# Patient Record
Sex: FEMALE | Race: WHITE | Employment: FULL TIME | URBAN - NONMETROPOLITAN AREA
[De-identification: names, ages, dates, MRNs, and addresses within clinical notes are randomized per-mention and may not be internally consistent; named-entity substitution may affect disease eponyms.]

---

## 2019-02-01 ENCOUNTER — NURSE TRIAGE (OUTPATIENT)
Dept: ADMINISTRATIVE | Age: 39
End: 2019-02-01

## 2020-02-23 ENCOUNTER — APPOINTMENT (OUTPATIENT)
Dept: GENERAL RADIOLOGY | Age: 40
End: 2020-02-23
Payer: COMMERCIAL

## 2020-02-23 ENCOUNTER — HOSPITAL ENCOUNTER (EMERGENCY)
Age: 40
Discharge: HOME | End: 2020-02-24
Attending: EMERGENCY MEDICINE
Payer: COMMERCIAL

## 2020-02-23 LAB
EKG ATRIAL RATE: 69 BPM
EKG P AXIS: 63 DEGREES
EKG P-R INTERVAL: 148 MS
EKG Q-T INTERVAL: 362 MS
EKG QRS DURATION: 84 MS
EKG QTC CALCULATION (BAZETT): 387 MS
EKG R AXIS: 45 DEGREES
EKG T AXIS: 42 DEGREES
EKG VENTRICULAR RATE: 69 BPM

## 2020-02-23 PROCEDURE — 99284 EMERGENCY DEPT VISIT MOD MDM: CPT

## 2020-02-23 PROCEDURE — 87804 INFLUENZA ASSAY W/OPTIC: CPT

## 2020-02-23 PROCEDURE — 71046 X-RAY EXAM CHEST 2 VIEWS: CPT

## 2020-02-23 PROCEDURE — 93005 ELECTROCARDIOGRAM TRACING: CPT | Performed by: EMERGENCY MEDICINE

## 2020-02-23 RX ORDER — LEVOTHYROXINE SODIUM 0.05 MG/1
50 TABLET ORAL DAILY
COMMUNITY

## 2020-02-23 RX ORDER — IPRATROPIUM BROMIDE AND ALBUTEROL SULFATE 2.5; .5 MG/3ML; MG/3ML
1 SOLUTION RESPIRATORY (INHALATION) ONCE
Status: COMPLETED | OUTPATIENT
Start: 2020-02-24 | End: 2020-02-24

## 2020-02-23 RX ORDER — HYDROXYZINE PAMOATE 50 MG/1
50 CAPSULE ORAL 3 TIMES DAILY PRN
COMMUNITY

## 2020-02-23 ASSESSMENT — ENCOUNTER SYMPTOMS
NAUSEA: 0
VOMITING: 0
ABDOMINAL DISTENTION: 0
COUGH: 1
EYE ITCHING: 0
CONSTIPATION: 0
BACK PAIN: 0
ABDOMINAL PAIN: 0
CHEST TIGHTNESS: 1
DIARRHEA: 0
SORE THROAT: 0
STRIDOR: 0
PHOTOPHOBIA: 0
EYE REDNESS: 0
SHORTNESS OF BREATH: 1
EYE DISCHARGE: 0
EYE PAIN: 0
WHEEZING: 0
RHINORRHEA: 0

## 2020-02-23 ASSESSMENT — PAIN SCALES - GENERAL: PAINLEVEL_OUTOF10: 6

## 2020-02-23 ASSESSMENT — PAIN DESCRIPTION - PAIN TYPE: TYPE: ACUTE PAIN

## 2020-02-23 ASSESSMENT — PAIN DESCRIPTION - LOCATION: LOCATION: CHEST

## 2020-02-24 VITALS
HEART RATE: 65 BPM | HEIGHT: 70 IN | SYSTOLIC BLOOD PRESSURE: 112 MMHG | RESPIRATION RATE: 16 BRPM | BODY MASS INDEX: 30.78 KG/M2 | OXYGEN SATURATION: 97 % | WEIGHT: 215 LBS | DIASTOLIC BLOOD PRESSURE: 72 MMHG | TEMPERATURE: 97.7 F

## 2020-02-24 LAB
FLU A ANTIGEN: NEGATIVE
FLU B ANTIGEN: NEGATIVE

## 2020-02-24 PROCEDURE — 94640 AIRWAY INHALATION TREATMENT: CPT

## 2020-02-24 PROCEDURE — 94761 N-INVAS EAR/PLS OXIMETRY MLT: CPT

## 2020-02-24 PROCEDURE — 93010 ELECTROCARDIOGRAM REPORT: CPT | Performed by: INTERNAL MEDICINE

## 2020-02-24 PROCEDURE — 6370000000 HC RX 637 (ALT 250 FOR IP): Performed by: EMERGENCY MEDICINE

## 2020-02-24 RX ORDER — ALBUTEROL SULFATE 90 UG/1
2 AEROSOL, METERED RESPIRATORY (INHALATION) 4 TIMES DAILY PRN
Qty: 1 INHALER | Refills: 1 | Status: SHIPPED | OUTPATIENT
Start: 2020-02-24

## 2020-02-24 RX ORDER — BENZONATATE 100 MG/1
100 CAPSULE ORAL 3 TIMES DAILY PRN
Qty: 30 CAPSULE | Refills: 0 | Status: SHIPPED | OUTPATIENT
Start: 2020-02-24 | End: 2020-03-05

## 2020-02-24 RX ADMIN — IPRATROPIUM BROMIDE AND ALBUTEROL SULFATE 1 AMPULE: .5; 3 SOLUTION RESPIRATORY (INHALATION) at 00:15

## 2020-02-24 NOTE — ED TRIAGE NOTES
Pt presents ambulatory to ER with complaints of cough over past several weeks dx with bronchitis at urgent care. Pt states cough becoming more productive and increasing sob since last week.

## 2020-02-24 NOTE — ED PROVIDER NOTES
syncope, weakness and headaches. Psychiatric/Behavioral: Negative for agitation, behavioral problems, confusion, self-injury, sleep disturbance and suicidal ideas. PAST MEDICAL HISTORY    has a past medical history of Seizures ([address] and Varicosities. SURGICAL HISTORY      has a past surgical history that includes Urethra surgery (implant when pt born) and  section, low transverse (). CURRENT MEDICATIONS       Discharge Medication List as of 2020  1:02 AM      CONTINUE these medications which have NOT CHANGED    Details   levothyroxine (SYNTHROID) 50 MCG tablet Take 50 mcg by mouth DailyHistorical Med      hydrOXYzine (VISTARIL) 50 MG capsule Take 50 mg by mouth 3 times daily as needed for AnxietyHistorical Med      ibuprofen (ADVIL;MOTRIN) 800 MG tablet Take 1 tablet by mouth every 8 hours as needed, Disp-60 tablet, R-1      levetiracetam (KEPPRA) 500 MG tablet Take 500 mg by mouth 2 times daily. carBAMazepine (TEGRETOL) 200 MG tablet Take 200 mg by mouth 2 times daily. ALLERGIES     has No Known Allergies. FAMILY HISTORY     has no family status information on file. family history is not on file. SOCIAL HISTORY      reports that she has been smoking cigarettes. She has a 10.00 pack-year smoking history. She has never used smokeless tobacco. She reports that she does not drink alcohol or use drugs. PHYSICAL EXAM     INITIAL VITALS:  height is 5' 10\" (1.778 m) and weight is 215 lb (97.5 kg). Her oral temperature is 97.7 °F (36.5 °C). Her blood pressure is 112/72 and her pulse is 65. Her respiration is 16 and oxygen saturation is 97%. Physical Exam  Vitals signs and nursing note reviewed. Constitutional:       Appearance: She is well-developed. She is not diaphoretic. HENT:      Head: Normocephalic and atraumatic. Nose: Nose normal.   Eyes:      General: No scleral icterus. Right eye: No discharge. Left eye: No discharge. Conjunctiva/sclera: Conjunctivae normal.      Pupils: Pupils are equal, round, and reactive to light. Neck:      Musculoskeletal: Normal range of motion and neck supple. Vascular: No JVD. Trachea: No tracheal deviation. Cardiovascular:      Rate and Rhythm: Normal rate and regular rhythm. Heart sounds: Normal heart sounds. No murmur. No friction rub. No gallop. Pulmonary:      Effort: Pulmonary effort is normal. No respiratory distress. Breath sounds: No stridor. Rhonchi present. No wheezing or rales. Chest:      Chest wall: No tenderness. Abdominal:      General: Bowel sounds are normal. There is no distension. Palpations: Abdomen is soft. There is no mass. Tenderness: There is no abdominal tenderness. There is no guarding or rebound. Hernia: No hernia is present. Musculoskeletal:         General: No tenderness or deformity. Lymphadenopathy:      Cervical: No cervical adenopathy. Skin:     General: Skin is warm and dry. Capillary Refill: Capillary refill takes less than 2 seconds. Coloration: Skin is not pale. Findings: No erythema or rash. Neurological:      Mental Status: She is alert and oriented to person, place, and time. Cranial Nerves: No cranial nerve deficit. Sensory: No sensory deficit. Motor: No abnormal muscle tone. Coordination: Coordination normal.      Deep Tendon Reflexes: Reflexes normal.   Psychiatric:         Behavior: Behavior normal.         Thought Content: Thought content normal.         Judgment: Judgment normal.      Comments: She seems anxious.             DIFFERENTIAL DIAGNOSIS:   URI, Flu, pneumonia, bronchitis    DIAGNOSTIC RESULTS     EKG: All EKG's are interpreted by the Emergency Department Physician who either signs or Co-signsthis chart in the absence of a cardiologist.  None    RADIOLOGY: non-plain film images(s) such as CT, Ultrasound and MRI are read by the radiologist.    XR CHEST STANDARD

## 2020-06-26 LAB
ABSOLUTE BASO #: 0 /CMM (ref 0–200)
ABSOLUTE EOS #: 100 /CMM (ref 0–500)
ABSOLUTE LYMPH #: 1900 /CMM (ref 1000–4800)
ABSOLUTE MONO #: 400 /CMM (ref 0–800)
ABSOLUTE NEUT #: 4600 /CMM (ref 1800–7700)
ANION GAP SERPL CALCULATED.3IONS-SCNC: 6 MMOL/L (ref 4–12)
BASOPHILS RELATIVE PERCENT: 0.6 % (ref 0–2)
BUN BLDV-MCNC: 4 MG/DL (ref 7–20)
CALCIUM SERPL-MCNC: 9.1 MG/DL (ref 8.8–10.5)
CHLORIDE BLD-SCNC: 106 MEQ/L (ref 101–111)
CO2: 24 MEQ/L (ref 21–32)
CREAT SERPL-MCNC: 0.53 MG/DL (ref 0.6–1.3)
CREATININE CLEARANCE: >60
EOSINOPHILS RELATIVE PERCENT: 1.7 % (ref 0–6)
GLUCOSE, FASTING: 69 MG/DL (ref 70–110)
HCT VFR BLD CALC: 48.3 % (ref 35–44)
HEMOGLOBIN: 16.2 GM/DL (ref 12–15)
LYMPHOCYTES RELATIVE PERCENT: 26.7 % (ref 15–45)
MCH RBC QN AUTO: 32.2 PG (ref 27.5–33)
MCHC RBC AUTO-ENTMCNC: 33.5 GM/DL (ref 33–36)
MCV RBC AUTO: 95.9 CU MIC (ref 80–97)
MONOCYTES RELATIVE PERCENT: 6.1 % (ref 2–10)
NEUTROPHILS RELATIVE PERCENT: 64.9 % (ref 40–70)
NUCLEATED RBCS: 0.1 /100 WBC
PDW BLD-RTO: 12.7 % (ref 12–16)
PLATELET # BLD: 232 TH/CMM (ref 150–400)
POTASSIUM SERPL-SCNC: 4 MEQ/L (ref 3.6–5)
RBC # BLD: 5.03 MIL/CMM (ref 4–5.1)
SODIUM BLD-SCNC: 136 MEQ/L (ref 135–145)
WBC # BLD: 7.1 TH/CMM (ref 4.4–10.5)

## 2021-11-11 ENCOUNTER — NURSE ONLY (OUTPATIENT)
Dept: LAB | Age: 41
End: 2021-11-11

## 2021-11-15 LAB
APTIMA MEDIA TYPE: NORMAL
C. TRACHOMATIS DNA,THIN PREP: NEGATIVE
N. GONORRHOEAE DNA, THIN PREP: NEGATIVE
SOURCE: NORMAL
T. VAGINALIS SPECIMEN SOURCE: NORMAL
TRICHOMONAS VAGINALIS BY NAA: NEGATIVE

## 2021-11-19 LAB — CYTOLOGY THIN PREP PAP: NORMAL

## 2022-09-17 ENCOUNTER — HOSPITAL ENCOUNTER (EMERGENCY)
Age: 42
Discharge: LEFT BEFORE DISCHARGE | End: 2022-09-17
Attending: EMERGENCY MEDICINE
Payer: COMMERCIAL

## 2022-09-17 ENCOUNTER — APPOINTMENT (OUTPATIENT)
Dept: CT IMAGING | Age: 42
End: 2022-09-17
Payer: COMMERCIAL

## 2022-09-17 VITALS
TEMPERATURE: 97.7 F | OXYGEN SATURATION: 96 % | BODY MASS INDEX: 30.36 KG/M2 | HEART RATE: 82 BPM | HEIGHT: 69 IN | WEIGHT: 205 LBS | RESPIRATION RATE: 19 BRPM | DIASTOLIC BLOOD PRESSURE: 64 MMHG | SYSTOLIC BLOOD PRESSURE: 118 MMHG

## 2022-09-17 DIAGNOSIS — N39.0 URINARY TRACT INFECTION WITH HEMATURIA, SITE UNSPECIFIED: Primary | ICD-10-CM

## 2022-09-17 DIAGNOSIS — R31.9 URINARY TRACT INFECTION WITH HEMATURIA, SITE UNSPECIFIED: Primary | ICD-10-CM

## 2022-09-17 DIAGNOSIS — R33.8 ACUTE URINARY RETENTION: ICD-10-CM

## 2022-09-17 LAB
ALBUMIN SERPL-MCNC: 3.9 G/DL (ref 3.5–5.1)
ALP BLD-CCNC: 81 U/L (ref 38–126)
ALT SERPL-CCNC: 10 U/L (ref 11–66)
ANION GAP SERPL CALCULATED.3IONS-SCNC: 9 MEQ/L (ref 8–16)
AST SERPL-CCNC: 14 U/L (ref 5–40)
BACTERIA: ABNORMAL /HPF
BASOPHILS # BLD: 0.3 %
BASOPHILS ABSOLUTE: 0 THOU/MM3 (ref 0–0.1)
BILIRUB SERPL-MCNC: 0.3 MG/DL (ref 0.3–1.2)
BILIRUBIN URINE: NEGATIVE
BLOOD, URINE: ABNORMAL
BUN BLDV-MCNC: 8 MG/DL (ref 7–22)
CALCIUM SERPL-MCNC: 8.8 MG/DL (ref 8.5–10.5)
CASTS 2: ABNORMAL /LPF
CASTS UA: ABNORMAL /LPF
CHARACTER, URINE: ABNORMAL
CHLORIDE BLD-SCNC: 101 MEQ/L (ref 98–111)
CO2: 25 MEQ/L (ref 23–33)
COLOR: YELLOW
CREAT SERPL-MCNC: 0.6 MG/DL (ref 0.4–1.2)
CRYSTALS, UA: ABNORMAL
EOSINOPHIL # BLD: 1 %
EOSINOPHILS ABSOLUTE: 0.1 THOU/MM3 (ref 0–0.4)
EPITHELIAL CELLS, UA: ABNORMAL /HPF
ERYTHROCYTE [DISTWIDTH] IN BLOOD BY AUTOMATED COUNT: 11.9 % (ref 11.5–14.5)
ERYTHROCYTE [DISTWIDTH] IN BLOOD BY AUTOMATED COUNT: 41.6 FL (ref 35–45)
GFR SERPL CREATININE-BSD FRML MDRD: > 90 ML/MIN/1.73M2
GLUCOSE BLD-MCNC: 98 MG/DL (ref 70–108)
GLUCOSE URINE: NEGATIVE MG/DL
HCT VFR BLD CALC: 43 % (ref 37–47)
HEMOGLOBIN: 15 GM/DL (ref 12–16)
IMMATURE GRANS (ABS): 0.06 THOU/MM3 (ref 0–0.07)
IMMATURE GRANULOCYTES: 0.5 %
KETONES, URINE: NEGATIVE
LEUKOCYTE ESTERASE, URINE: ABNORMAL
LYMPHOCYTES # BLD: 11.6 %
LYMPHOCYTES ABSOLUTE: 1.4 THOU/MM3 (ref 1–4.8)
MCH RBC QN AUTO: 33 PG (ref 26–33)
MCHC RBC AUTO-ENTMCNC: 34.9 GM/DL (ref 32.2–35.5)
MCV RBC AUTO: 94.5 FL (ref 81–99)
MISCELLANEOUS 2: ABNORMAL
MONOCYTES # BLD: 6.5 %
MONOCYTES ABSOLUTE: 0.8 THOU/MM3 (ref 0.4–1.3)
NITRITE, URINE: NEGATIVE
NUCLEATED RED BLOOD CELLS: 0 /100 WBC
OSMOLALITY CALCULATION: 268.4 MOSMOL/KG (ref 275–300)
PH UA: 7.5 (ref 5–9)
PLATELET # BLD: 238 THOU/MM3 (ref 130–400)
PMV BLD AUTO: 10.8 FL (ref 9.4–12.4)
POTASSIUM SERPL-SCNC: 3.8 MEQ/L (ref 3.5–5.2)
PREGNANCY, SERUM: NEGATIVE
PROTEIN UA: 30
RBC # BLD: 4.55 MILL/MM3 (ref 4.2–5.4)
RBC URINE: ABNORMAL /HPF
RENAL EPITHELIAL, UA: ABNORMAL
SEG NEUTROPHILS: 80.1 %
SEGMENTED NEUTROPHILS ABSOLUTE COUNT: 9.9 THOU/MM3 (ref 1.8–7.7)
SODIUM BLD-SCNC: 135 MEQ/L (ref 135–145)
SPECIFIC GRAVITY, URINE: 1.01 (ref 1–1.03)
TOTAL PROTEIN: 6.5 G/DL (ref 6.1–8)
UROBILINOGEN, URINE: 0.2 EU/DL (ref 0–1)
WBC # BLD: 12.4 THOU/MM3 (ref 4.8–10.8)
WBC UA: > 200 /HPF
YEAST: ABNORMAL

## 2022-09-17 PROCEDURE — 99284 EMERGENCY DEPT VISIT MOD MDM: CPT

## 2022-09-17 PROCEDURE — 51702 INSERT TEMP BLADDER CATH: CPT

## 2022-09-17 PROCEDURE — 85025 COMPLETE CBC W/AUTO DIFF WBC: CPT

## 2022-09-17 PROCEDURE — 87086 URINE CULTURE/COLONY COUNT: CPT

## 2022-09-17 PROCEDURE — 96365 THER/PROPH/DIAG IV INF INIT: CPT

## 2022-09-17 PROCEDURE — 51798 US URINE CAPACITY MEASURE: CPT

## 2022-09-17 PROCEDURE — 87186 SC STD MICRODIL/AGAR DIL: CPT

## 2022-09-17 PROCEDURE — 2580000003 HC RX 258: Performed by: EMERGENCY MEDICINE

## 2022-09-17 PROCEDURE — 6360000002 HC RX W HCPCS: Performed by: EMERGENCY MEDICINE

## 2022-09-17 PROCEDURE — 84703 CHORIONIC GONADOTROPIN ASSAY: CPT

## 2022-09-17 PROCEDURE — 87077 CULTURE AEROBIC IDENTIFY: CPT

## 2022-09-17 PROCEDURE — 80053 COMPREHEN METABOLIC PANEL: CPT

## 2022-09-17 PROCEDURE — 81001 URINALYSIS AUTO W/SCOPE: CPT

## 2022-09-17 RX ORDER — CEPHALEXIN 500 MG/1
500 CAPSULE ORAL 4 TIMES DAILY
Qty: 28 CAPSULE | Refills: 0 | Status: SHIPPED | OUTPATIENT
Start: 2022-09-17 | End: 2022-09-24

## 2022-09-17 RX ADMIN — CEFTRIAXONE SODIUM 1000 MG: 1 INJECTION, POWDER, FOR SOLUTION INTRAMUSCULAR; INTRAVENOUS at 07:07

## 2022-09-17 ASSESSMENT — PAIN SCALES - GENERAL: PAINLEVEL_OUTOF10: 3

## 2022-09-17 ASSESSMENT — ENCOUNTER SYMPTOMS
SHORTNESS OF BREATH: 0
TROUBLE SWALLOWING: 0
VOMITING: 0
EYE REDNESS: 0
NAUSEA: 0
BACK PAIN: 0
ABDOMINAL PAIN: 0
COUGH: 0

## 2022-09-17 ASSESSMENT — PAIN DESCRIPTION - DESCRIPTORS: DESCRIPTORS: PRESSURE

## 2022-09-17 ASSESSMENT — PAIN DESCRIPTION - ORIENTATION: ORIENTATION: LOWER

## 2022-09-17 ASSESSMENT — PAIN - FUNCTIONAL ASSESSMENT: PAIN_FUNCTIONAL_ASSESSMENT: 0-10

## 2022-09-17 ASSESSMENT — PAIN DESCRIPTION - PAIN TYPE: TYPE: ACUTE PAIN

## 2022-09-17 ASSESSMENT — PAIN DESCRIPTION - LOCATION: LOCATION: ABDOMEN

## 2022-09-17 NOTE — ED PROVIDER NOTES
Transfer of Care Note:   I have personally performed a face to face diagnostic evaluation on this patient. The patient's initial evaluation and plan have been discussed with the prior provider who initially evaluated the patient. Nursing Notes, Past Medical Hx, Past Surgical Hx, Social Hx, Allergies, and Family Hx were all reviewed. (Please note that portions of this note were completed with a voice recognition program.  Efforts were made to edit the dictations but occasionally words are mis-transcribed.)    7:18 AM EDT: The patient was evaluated. Sophia Vanessa is a 39 y.o. female who presents to the Emergency Department for the evaluation of urinary hesitancy/retention, found to have UTI, garibay placed. RADIOLOGY: non-plain film images(s) such as CT, Ultrasound and MRI are read by the radiologist.  No orders to display       ED LABS:  Labs Reviewed   CBC WITH AUTO DIFFERENTIAL - Abnormal; Notable for the following components:       Result Value    WBC 12.4 (*)     Segs Absolute 9.9 (*)     All other components within normal limits   COMPREHENSIVE METABOLIC PANEL - Abnormal; Notable for the following components:    ALT 10 (*)     All other components within normal limits   OSMOLALITY - Abnormal; Notable for the following components:    Osmolality Calc 268.4 (*)     All other components within normal limits   URINE WITH REFLEXED MICRO - Abnormal; Notable for the following components:    Blood, Urine LARGE (*)     Protein, UA 30 (*)     Leukocyte Esterase, Urine LARGE (*)     Character, Urine CLOUDY (*)     All other components within normal limits   CULTURE, REFLEXED, URINE   ANION GAP   GLOMERULAR FILTRATION RATE, ESTIMATED   HCG, SERUM, QUALITATIVE       MDM:  Patient with urinary hesitancy/incomplete bladder emptying found to have UTI, garibay placed. At time of sign out awaiting CT results. Abx were ordered to start in ED. I updated patient regarding labs at approximately 07:20.   Advised patient of UTI for which antibiotics have been started, awaiting CT results and may need to leave Garibay in place due to her urinary retention upon presentation. Patient states she will not go home with garibay catheter in. Risks/benefits of Garibay removal discussed. Patient states she wants Garibay catheter removed. Will stay in ED for imaging studies. Patient states at approximately 07:44 am that she now wishes to leave, does not wish to undergo any further imaging including CT scan or other testing. Will remove Garibay per patient request. Patient signed AMA. In spite of multiple attempts by myself and staff to convince the patient to stay for further evaluation and treatment, we have unfortunately been unsuccessful. However, the patient has the capacity to give, receive, and withhold information. The patient does not appear intoxicated or responding to external stimuli. The patient verbalizes understanding of their condition and symptoms and that this represents a significant threat. Risks and benefits of staying for further evaluation and treatment or leaving AMA have been discussed and acknowledged by the patient. The patient has verbalized to me that they understand the plan of diagnosis and treatment, and unfortunately will not agree and understand that it may cause worsening of their condition or death. The patient understands that they are welcome to return to the ED at any time for further care without prejudice and we will be happy to provide treatment again. FINAL IMPRESSION      1. Urinary tract infection with hematuria, site unspecified    2. Acute urinary retention        Care of this patient was transferred from Dr. Vitaly Macias to myself at shift change. Provider:  I personally performed the services described in the documentation, reviewed and edited the documentation which was dictated in my presence, and it accurately records my words and actions.     Ofelia Quan MD 9/17/22 3:21 PM Bandar Flood MD  09/17/22 5899

## 2022-09-17 NOTE — ED PROVIDER NOTES
[facility] [address] [address] EMERGENCY DEPT    EMERGENCY MEDICINE     Pt Name: Laura Adams  MRN: [id number]  [place] 1980  Date of evaluation: 2022  Provider: Bronson Whitehead MD,     [address] [place] [address]       Chief Complaint   Patient presents with    Other     Difficulty urinating       HISTORY OF PRESENT ILLNESS    Laura Adams is a pleasant 39 y.o. female who presents to the emergency department from home with complaints of difficulty in urinating. Patient states that she has had a sensation of incomplete emptying for the past 24 hours. She denies any pain with urination, nausea, vomiting, hematuria, or flank pain. She woke up early in the morning and felt the urge to urinate. She went to the bathroom and only was able to dribble a little bit out. She laid back down but she felt her bladder was very full and she was uncomfortable. 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 She has not had any complications from this. She denies any trauma to the area though she did have sex yesterday. There was no trauma or injury during the sexual encounter. Triage notes and Nursing notes were reviewed by myself. Any discrepancies are addressed above.     PAST MEDICAL HISTORY     Past Medical History:   Diagnosis Date    Seizures ([address]     since young child has epilepsy    Varicosities        SURGICAL HISTORY       Past Surgical History:   Procedure Laterality Date     SECTION, LOW TRANSVERSE  2013    Placenta Previa    URETHRA SURGERY  implant when pt born       CURRENT MEDICATIONS       Discharge Medication List as of 2022  7:44 AM        CONTINUE these medications which have NOT CHANGED    Details   albuterol sulfate  (90 Base) MCG/ACT inhaler Inhale 2 puffs into the lungs 4 times daily as needed for Wheezing, Disp-1 Inhaler, R-1Print      levothyroxine (SYNTHROID) 50 MCG tablet Take 50 mcg by mouth DailyHistorical Med hydrOXYzine (VISTARIL) 50 MG capsule Take 50 mg by mouth 3 times daily as needed for AnxietyHistorical Med      ibuprofen (ADVIL;MOTRIN) 800 MG tablet Take 1 tablet by mouth every 8 hours as needed, Disp-60 tablet, R-1      levetiracetam (KEPPRA) 500 MG tablet Take 500 mg by mouth 2 times daily. carBAMazepine (TEGRETOL) 200 MG tablet Take 200 mg by mouth 2 times daily. ALLERGIES     Patient has no known allergies. FAMILY HISTORY     History reviewed. No pertinent family history. SOCIAL HISTORY       Social History     Socioeconomic History    Marital status: Single     Spouse name: None    Number of children: None    Years of education: None    Highest education level: None   Tobacco Use    Smoking status: Every Day     Packs/day: 0.50     Years: 20.00     Pack years: 10.00     Types: Cigarettes    Smokeless tobacco: Never   Substance and Sexual Activity    Alcohol use: No    Drug use: No    Sexual activity: Yes     Partners: Male       REVIEW OF SYSTEMS     Review of Systems   Constitutional:  Negative for fatigue and fever. HENT:  Negative for congestion and trouble swallowing. Eyes:  Negative for redness. Respiratory:  Negative for cough and shortness of breath. Cardiovascular:  Negative for chest pain. Gastrointestinal:  Negative for abdominal pain, nausea and vomiting. Genitourinary:  Positive for difficulty urinating and urgency. Negative for dysuria, flank pain, vaginal bleeding and vaginal discharge. Musculoskeletal:  Negative for back pain. Skin:  Negative for rash. Allergic/Immunologic: Negative for immunocompromised state. Neurological:  Negative for light-headedness. Hematological:  Does not bruise/bleed easily. Except as noted above the remainder of the review of systems was reviewed and is.    PHYSICAL EXAM    (up to 7 for level 4, 8 or more for level 5)     ED Triage Vitals [09/17/22 0442]   BP Temp Temp Source Heart Rate Resp SpO2 Height Weight 118/64 97.7 °F (36.5 °C) Oral 82 17 96 % 5' 9\" (1.753 m) 205 lb (93 kg)       Physical Exam  Vitals and nursing note reviewed. Exam conducted with a chaperone present. Constitutional:       General: She is not in acute distress. Appearance: She is normal weight. She is not ill-appearing. HENT:      Head: Normocephalic and atraumatic. Nose: Nose normal. No congestion. Mouth/Throat:      Mouth: Mucous membranes are moist.      Pharynx: Oropharynx is clear. No oropharyngeal exudate or posterior oropharyngeal erythema. Eyes:      Extraocular Movements: Extraocular movements intact. Pupils: Pupils are equal, round, and reactive to light. Cardiovascular:      Rate and Rhythm: Normal rate and regular rhythm. Pulses: Normal pulses. Heart sounds: No murmur heard. No friction rub. Pulmonary:      Effort: Pulmonary effort is normal. No respiratory distress. Breath sounds: Normal breath sounds. No wheezing. Abdominal:      General: Abdomen is flat. Bowel sounds are normal. There is no distension. Palpations: Abdomen is soft. Tenderness: There is abdominal tenderness in the suprapubic area. There is no right CVA tenderness, left CVA tenderness, guarding or rebound. Comments: Suprapubic fullness and mild tenderness to palpation   Musculoskeletal:         General: Normal range of motion. Skin:     General: Skin is warm and dry. Capillary Refill: Capillary refill takes less than 2 seconds. Neurological:      General: No focal deficit present. Mental Status: She is alert and oriented to person, place, and time.        DIAGNOSTIC RESULTS     EKG:(none if blank)  All E[facility]'s are [facility] who either signs or Co-signs this chart in the absence of a cardiologist.        RADIOLOGY: (none if blank)   Interpretation per the Radiologistbelow, if available at the time of this note:    No orders to display       LABS:  Labs Reviewed   CULTURE, REFLEXED, URINE - Abnormal; Notable for the following components:       Result Value    Organism Enterobacter cloacae complex (*)     All other components within normal limits    Narrative:     Source: urine       Site:           Current Antibiotics: not stated   CBC WITH AUTO DIFFERENTIAL - Abnormal; Notable for the following components:    WBC 12.4 (*)     Segs Absolute 9.9 (*)     All other components within normal limits   COMPREHENSIVE METABOLIC PANEL - Abnormal; Notable for the following components:    ALT 10 (*)     All other components within normal limits   OSMOLALITY - Abnormal; Notable for the following components:    Osmolality Calc 268.4 (*)     All other components within normal limits   URINE WITH REFLEXED MICRO - Abnormal; Notable for the following components:    Blood, Urine LARGE (*)     Protein, UA 30 (*)     Leukocyte Esterase, Urine LARGE (*)     Character, Urine CLOUDY (*)     All other components within normal limits   ANION GAP   GLOMERULAR FILTRATION RATE, ESTIMATED   HCG, SERUM, QUALITATIVE       All other labs were within normal range or not returned as of this dictation. Please note, any cultures that may have been sent were not resulted at the time of this patient visit. EMERGENCY DEPARTMENT COURSE andMedical Decision Making:     MDM  Number of Diagnoses or Management Options  Acute urinary retention  Urinary tract infection with hematuria, site unspecified  Diagnosis management comments: 77-year-old female presents emergency room with difficulty urinating. Differential includes urinary retention, urinary tract infection, kidney stone, foreign body in the urethra, mass, electrolyte [place], dehydration. Will evaluate with CBC, CMP, UA. Will have a bladder scan done to assess for retention.     /  ED Course as of 09/20/22 2047   Sat Sep 17, 2022   8845 Bladder scan only showed 67 mls however given that I am able to palpate her bladder and she has such a sensation of being unable to urinate will have a Rodriguez catheter placed. [DD]   0701 Urinalysis is consistent with significant urinary tract infection. Given that urinary retention is not a common reaction to infection we will add on a noncontrast CT scan to ensure that there is no obstruction or abscess. [DD]      ED Course User Index  [DD] Levon Kathleen MD         The patient was evaluated during the global COVID-19 pandemic, and that diagnosis was considered upon their initial presentation. Their evaluation, treatment and testing was consistent with current guidelines for patients who present with complaints or symptoms that may be related to COVID-19. Strict returnprecautions and follow up instructions were discussed with the patient with which the patient agrees        ED Medications administered this visit:    Medications   cefTRIAXone (ROCEPHIN) 1,000 mg in dextrose 5 % 50 mL IVPB mini-bag (0 mg IntraVENous Stopped 9/17/22 0759)         Procedures: (None if blank)       CLINICAL       1. Urinary tract infection with hematuria, site unspecified    2.  Acute urinary retention          DISPOSITION/PLAN   DISPOSITION [place] 09/17/2022 07:41:17 AM      PATIENT REFERRED TO:  Anna Coto, [address]  [phone]    Schedule an appointment as soon as possible for a visit       Luis E Holliday   [address] [place] [address] [phone]  Schedule an appointment as soon as possible for a visit       DISCHARGE MEDICATIONS:  Discharge Medication List as of 9/17/2022  7:44 AM        START taking these medications    Details   cephALEXin (KEFLEX) 500 MG capsule Take 1 capsule by mouth 4 times daily for 7 days, Disp-28 capsule, R-0Normal                    (Please note that portions of this note were completed with a voice recognition program.  Efforts were made to edit the dictations but occasionallywords are mis-transcribed.)      Electronically signed by She Mason MD on 9/17/22 at 5:23 AM EDT    Attending Physician, Emergency Department         Anjana Vasquez MD  09/20/22 4853

## 2022-09-17 NOTE — ED NOTES
Pt resting in bed with eyes closed. Respirations even and unlabored. Pt mother at bedside, updated on plan of care.       Ayleen Winter RN  09/17/22 5797

## 2022-09-17 NOTE — ED NOTES
Rodriguez catheter placed. About 200mL urine output noticed after placement. Dr Meenu Overton informed.       Carlos Benavidez RN  09/17/22 7472

## 2022-09-17 NOTE — ED TRIAGE NOTES
Pt presents to the ED with c/o difficulty urinating. Pt states she has a history of urethral implant at 2 y/o. Pt states she has never had any issues. Pt states around 0100, she had went to the restroom, noticed she had slow urine output. Pt states when she went back to bed, she felt like her bladder was full and she had to void. Pt states she has since been dribbling urine and noticed a little blood in the toilet. Pt reports lower abdomen pressure, 2/10. Pt states she has had a UTI before but did not feel like this. Pt denies history of kidney stones.  Pt denies n/v/d.

## 2022-09-19 LAB
ORGANISM: ABNORMAL
URINE CULTURE REFLEX: ABNORMAL

## 2022-09-21 ENCOUNTER — TELEPHONE (OUTPATIENT)
Dept: PHARMACY | Age: 42
End: 2022-09-21

## 2022-09-21 RX ORDER — NITROFURANTOIN 25; 75 MG/1; MG/1
100 CAPSULE ORAL 2 TIMES DAILY
Qty: 10 CAPSULE | Refills: 0 | Status: SHIPPED | OUTPATIENT
Start: 2022-09-21 | End: 2022-09-26

## 2022-09-21 NOTE — TELEPHONE ENCOUNTER
Pharmacy Note  ED Culture Follow-up    Frederick Whitt is a 39 y.o. female. Allergies: Patient has no known allergies. Labs:  Lab Results   Component Value Date    BUN 8 09/17/2022    CREATININE 0.6 09/17/2022    WBC 12.4 (H) 09/17/2022     Estimated Creatinine Clearance: 150 mL/min (based on SCr of 0.6 mg/dL). Current antimicrobials:   Cephalexin 500 mg 4x daily x 7 days (R)    ASSESSMENT:  Micro results:   Urine culture: positive for enterobacter cloacae     PLAN:  Need for intervention: Yes  Discussed with: ANDREINA Young  Chosen treatment:    Stop taking cephalexin. Start taking nitrofurantoin 100 mg BID x 5 days. Patient response:   Called and spoke with patient. She states she is still having UTI symptoms. Informed her of positive urine culture results. Informed her to stop taking cephalexin and to start taking nitrofurantoin. Patient voiced understanding. Called/sent in prescription to: [facility]    Please call with any questions.  Gilbert Blanco [facility] [place], PharmD 5:04 PM 9/21/2022

## 2022-10-13 ENCOUNTER — HOSPITAL ENCOUNTER (OUTPATIENT)
Dept: OCCUPATIONAL THERAPY | Age: 42
Setting detail: THERAPIES SERIES
Discharge: HOME | End: 2022-10-13
Payer: COMMERCIAL

## 2022-10-13 PROCEDURE — 97035 APP MDLTY 1+ULTRASOUND EA 15: CPT

## 2022-10-13 PROCEDURE — 97165 OT EVAL LOW COMPLEX 30 MIN: CPT

## 2022-10-13 NOTE — PROGRESS NOTES
** PLEASE SIGN, DATE AND TIME CERTIFICATION BELOW AND RETURN TO [facility] OUTPATIENT REHABILITATION (FAX #: [phone]). ATTEST/CO-SIGN IF ACCESSING VIA [employer]. THANK YOU.**    I certify that I have examined the patient below and determined that Physical Medicine and Rehabilitation service is necessary and that I approve the established plan of care for up to 90 days or as specifically noted. Attestation, signature or co-signature of physician indicates approval of certification requirements.    ________________________ ____________ __________  Physician Signature   Date   Time   [address] THERAPY  [x] EVALUATION  [] DAILY NOTE (LAND) [] DAILY NOTE (AQUATIC ) [] PROGRESS NOTE [] DISCHARGE NOTE    [] [address] [facility]   [] [place] [address]    [] [address] [facility]   [x] Vera Lesser    Date: 10/13/2022  Patient Name:  Janice Turpin  : 1980  MRN: [id number]  CSN: [id number]    Referring Practitioner Sruthi Quintana MD   Diagnosis Other specified postprocedural states [Z98.890]   Carpal tunnel syndrome, unspecified upper limb [G56.00]  Lesion of ulnar nerve, unspecified upper limb [G56.20]  Osteoarthritis of first carpometacarpal joint, unspecified [M18.9]    Treatment Diagnosis Pain in left thumb and wrist, decreased sensation left hand, decreased left  and pinch strength   Date of Evaluation 10/13/22      Functional Outcome Measure Used Upper Extremity Functional Scale   Functional Outcome Score 68/80  (10/13/22)       Insurance: Primary: Payor: UMR /  /  / ,   Secondary:    Authorization Information: 60 visits per calendar year PT/OT/ST combined - none used, aquatics not covered, modalities covered, no massage   Visit # 1, 1/10 for progress note   Visits Allowed: 60   Recertification Date: 2022   Physician Follow-Up: Approximately 2 weeks from now   Physician Orders: Script for eval and treat.   ADLs, functional activities, manual therapy techniques, therapeutic exercises, HEP, modalities as needed, ROM, strengthening   Pertinent History: Patient states pain in her left forearm and thumb joint started to get worse in April of 2022 when she started to do a more repetitive job. 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 Patient reports she had cortisone injection in her thumb joint on 9/27/22. Patient states testing showed arthritis in her thumb and carpal tunnel syndrome. Past medical history includes epilepsy, anxiety disorder. SUBJECTIVE: Patient states she is to have therapy prior to possibility of having insurance. Social/Functional History:  Medications and Allergies have been reviewed and are listed on the [address] lives with family in a single story home with stairs and no handrail to enter. .    Task Prior Level of Function  (current level of function addressed below)   ADLs  Independent   Ambulation Independent   Transfers Independent   Hobbies boo   Driving Active    Work dakick. Occupation: [employer],  - repetative grasping and reaching     OBJECTIVE:  [address] right handed   Palpation Grinding in thumb joint   Posture Forward shoulders   Edema Mild edema CMC joint       ADL's Difficulty opening jars and bottles, buttoning buttons, increased pain with work tasks - grasping and turning parts       Sensation Left Upper Extremity: Diminished. Tingling in left hand   Coordination 9 Hole Peg Test:   Right: 28 seconds     Left:   26 seconds.   Digit tip opposition intact           LEFT UPPER EXTREMITY  RANGE OF MOTION    AROM PROM COMMENTS         Shoulder Flexion      Shoulder Extension      Shoulder Abduction      Shoulder Adduction      Shoulder External Rotation      Shoulder Internal Rotation      Shoulder Range of Motion is [facility]  [x]      Elbow Flexion      Elbow Extension Forearm Pronation      Forearm Supination      Elbow Range of Motion is [facility]  [x]      Wrist Flexion 80     Wrist Extension 75     Wrist Radial Deviation 20     Wrist Ulnar Deviation 30     Wrist Range of Motion is [facility]  []   Left thumb AROM MP flexion = 55, IP flexion = 85 degrees   Left thumb AROM palmar abduction = 50, PROM = 55 degrees   Left thumb AROM radial abduction = 40, PROM = 45 degrees   If no measurement is recorded, no formal assessment was completed for that motion. LEFT UPPER EXTREMITY  STRENGTH    Strength Rating Comments   Shoulder Flexion     Shoulder Extension     Shoulder Abduction     Shoulder Adduction     Shoulder External Rotation     Shoulder Internal Rotation     []  Shoulder Strength is grossly WFL. Elbow Flexion     Elbow Extension     Forearm Pronation     Forearm Supination     [] Elbow Strength is grossly WFL. Wrist Flexion 4+/5    Wrist Extension 4+/5    Wrist Radial Deviation 4/5 Pain reported with resistance testing   Wrist Ulnar Deviation 4/5    []  Wrist Strength is grossly WFL. Right Left    Strength Setting:      Pinch Strength Tip Pinch:      Lateral Pinch           If no ratings are recorded, no formal assessment was completed.      TREATMENT   Precautions: none per patient    Pain: 3/10 left thumb, wrist     X in shaded column indicates Activity Completed Today   Modalities Parameters/  Location  Notes/Comments   Ultasound Left thenar area/radial wrist 50% pulsed ultrasound 3.3 MHz, 0.8 mari/cm2, x 8 minutes X                Manual Therapy Time/  Technique  Notes/Comments                     Exercises   Sets/  Sec Reps  Notes/Comments                                                    Activities Time    Notes/Comments                       Specific Interventions Next Treatment: ultrasound for pain control, STM, /pinch strengthening, kinesiotaping, nerve glides, HEP    Activity/Treatment Tolerance:  [x]  Patient tolerated treatment well  [] Patient limited by fatigue  []  Patient limited by pain   []  Patient limited by other medical complications  []  Other:     Assessment: Patient presents with pain in her left thumb and wrist as well as crepitus with thumb ROM. Patient has impaired left  and pinch with work and home tasks. She reports difficulty and pain with opening jars and containers, buttoning buttons, grasping and turning parts at work. Crepitus is noted with left thumb ROM. Patient requires skilled OT to increase functional strength, decrease pain, modalities for increase ease with  and pinch, increased strength for return to prior level of functioning. Areas for Improvement: edema, impaired ROM, impaired sensation, impaired strength, and pain  Prognosis: good    GOALS:  Patient Goal: Increase strength. Decrease pain to  better. Short Term Goals:  Time Frame: 5 weeks   Patient will be independent with UE HEP for increased ease with grasping objects for inspection at work. Patient will report pain in left hand/wrist no greater than 2/10 with use. Patient will increase left  strength to 55, left tip pinch to 8 and lateral pinch to 10# for increased ease with opening jars and packages. Patient will increase AROM left thumb radial abduction to 50 degrees for increased ease with grasping and opening jars/containers. Long Term Goals:  Time Frame: 6 weeks  1. Patient will improve UEFS to at least 73.  2.  Patient will report no more than 1/10 pain in left hand/thumb with opening bottles and grasping tasks at work. Patient Education:   [x]  HEP/Education Completed: Plan of Care, Goals, purpose of ultrasound  [employer] Access Code for HEP: Access Code: [email or web address]  URL: [email or web address]  Date: 10/13/2022  Prepared by:  Mary Little    Exercises  Wrist Prayer Stretch - 3 x daily - 7 x weekly - 1 sets - 5 reps - 19 hold  Seated Wrist Flexor Hook Fist Tendon Gliding - 3 x daily - 7 x weekly - 1 sets - 10 reps  Seated Wrist Flexor Full Fist Tendon Gliding - 3 x daily - 7 x weekly - 1 sets - 10 reps  Seated Straight Fist AROM - 3 x daily - 7 x weekly - 1 sets - 10 reps  Thumb Abduction AROM on Table - 3 x daily - 7 x weekly - 1 sets - 10 reps    []  No new Education completed  []  Reviewed Prior HEP      [x]  Patient verbalized and/or demonstrated understanding of education provided. []  Patient unable to verbalize and/or demonstrate understanding of education provided. Will continue education. [x]  Barriers to learning: none    PLAN:  Treatment Recommendations: Strengthening, Range of Motion, Manual Therapy - Soft Tissue Mobilization, Home Exercise Program, Modalities, and Kinesiotaping    [x]  Plan of care initiated. Plan to see patient 2 times per week for 6 weeks to address the treatment planned outlined above. []  Continue with current plan of care  []  Modify plan of care as follows:    []  Hold pending physician visit  []  Discharge    Time In 1435   Time Out 1527   Timed Code Minutes: 8 min   Total Treatment Time: 53 min       Electronically Signed by:  Lyn Elam OTR/L #[id number]

## 2022-10-19 ENCOUNTER — HOSPITAL ENCOUNTER (OUTPATIENT)
Dept: OCCUPATIONAL THERAPY | Age: 42
Setting detail: THERAPIES SERIES
Discharge: HOME | End: 2022-10-19
Payer: COMMERCIAL

## 2022-10-19 PROCEDURE — 97035 APP MDLTY 1+ULTRASOUND EA 15: CPT

## 2022-10-19 PROCEDURE — 97110 THERAPEUTIC EXERCISES: CPT

## 2022-10-19 NOTE — PROGRESS NOTES
[address] THERAPY  [] EVALUATION  [x] DAILY NOTE (LAND) [] DAILY NOTE (AQUATIC ) [] PROGRESS NOTE [] DISCHARGE NOTE    [] [facility]   [] [place]    [] [address] [facility]   [x] Linda Espinosa    Date: 10/19/2022  Patient Name:  Nelson Da Silva  : 1980  MRN: [id number]  CSN: [id number]    Referring Practitioner No ref. provider found   Diagnosis Other specified postprocedural states [Z98.890]   Carpal tunnel syndrome, unspecified upper limb [G56.00]  Lesion of ulnar nerve, unspecified upper limb [G56.20]  Osteoarthritis of first carpometacarpal joint, unspecified [M18.9]    Treatment Diagnosis Pain in left thumb and wrist, decreased sensation left hand, decreased left  and pinch strength   Date of Evaluation 10/13/22      Functional Outcome Measure Used Upper Extremity Functional Scale   Functional Outcome Score 68/80  (10/13/22)       Insurance: Primary: Payor: UMR /  /  / ,   Secondary:    Authorization Information: 60 visits per calendar year PT/OT/ST combined - none used, aquatics not covered, modalities covered, no massage   Visit # 2, 2/10 for progress note   Visits Allowed: 60   Recertification Date: 2022   Physician Follow-Up: Approximately 2 weeks from now   Physician Orders: Script for eval and treat. ADLs, functional activities, manual therapy techniques, therapeutic exercises, HEP, modalities as needed, ROM, strengthening   Pertinent History: Patient states pain in her left forearm and thumb joint started to get worse in 2022 when she started to do a more repetitive job. 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 Patient reports she had cortisone injection in her thumb joint on 22. Patient states testing showed arthritis in her thumb and carpal tunnel syndrome. Past medical history includes epilepsy, anxiety disorder. SUBJECTIVE: Patient arrives late to session, reports she forgot about her appointment. Patient reports taking daily anti-inflammatory. Patient reports (-) sleep disturbances. TREATMENT   Precautions: none per patient    Pain: 0/10 left thumb, wrist     X in shaded column indicates Activity Completed Today   Modalities Parameters/  Location  Notes/Comments   Ultasound Left thenar area/radial wrist 50% pulsed ultrasound 3.3 MHz, 0.8 mari/cm2, x 8 minutes X                Manual Therapy Time/  Technique  Notes/Comments   PROM L wrist all motions, Thumb ROM all motions to tolerance   x    Thenar eminence press   x Tightness and minor discomfort          Exercises   Sets/  Sec Reps  Notes/Comments   Thumb IP/MP Blocking 1 10 ea x    Thumb Flexion/extension, radial/palmar abduction 1 10 ea x    Hook Fist, Long Fist  1 10 ea x    Wrist AROM all planes  1 10 ea x    Wrist PREs 1# over towel roll 2 sec 10 ea x    Green Clothespins- isometric hold 2 point and lateral pinch  2 sec 10 ea x           Activities Time    Notes/Comments                       Specific Interventions Next Treatment: ultrasound for pain control, STM, /pinch strengthening, kinesiotaping, nerve glides, HEP    Activity/Treatment Tolerance:  [x]  Patient tolerated treatment well  []  Patient limited by fatigue  []  Patient limited by pain   []  Patient limited by other medical complications  []  Other:     Assessment: Patient reports to first session since OT evaluation. Initiated gentle strengthening noted above, good tolerance. Fatigue noted end of session, denies pain. Areas for Improvement: edema, impaired ROM, impaired sensation, impaired strength, and pain  Prognosis: good    GOALS:  Patient Goal: Increase strength. Decrease pain to  better. Short Term Goals:  Time Frame: 5 weeks   Patient will be independent with UE HEP for increased ease with grasping objects for inspection at work.   Patient will report pain in left hand/wrist no greater than 2/10 with use. Patient will increase left  strength to 55, left tip pinch to 8 and lateral pinch to 10# for increased ease with opening jars and packages. Patient will increase AROM left thumb radial abduction to 50 degrees for increased ease with grasping and opening jars/containers. Long Term Goals:  Time Frame: 6 weeks  1. Patient will improve UEFS to at least 73.  2.  Patient will report no more than 1/10 pain in left hand/thumb with opening bottles and grasping tasks at work. Patient Education:   []  HEP/Education Completed: Plan of Care, Goals, purpose of ultrasound  [employer] Access Code for HEP: Access Code: [email or web address]  URL: [email or web address]  Date: 10/13/2022  Prepared by: Keo Barlow    Exercises  Wrist Prayer Stretch - 3 x daily - 7 x weekly - 1 sets - 5 reps - 19 hold  Seated Wrist Flexor Hook Fist Tendon Gliding - 3 x daily - 7 x weekly - 1 sets - 10 reps  Seated Wrist Flexor Full Fist Tendon Gliding - 3 x daily - 7 x weekly - 1 sets - 10 reps  Seated Straight Fist AROM - 3 x daily - 7 x weekly - 1 sets - 10 reps  Thumb Abduction AROM on Table - 3 x daily - 7 x weekly - 1 sets - 10 reps    []  No new Education completed  [x]  Reviewed Prior HEP      [x]  Patient verbalized and/or demonstrated understanding of education provided. []  Patient unable to verbalize and/or demonstrate understanding of education provided. Will continue education. [x]  Barriers to learning: none    PLAN:  Treatment Recommendations: Strengthening, Range of Motion, Manual Therapy - Soft Tissue Mobilization, Home Exercise Program, Modalities, and Kinesiotaping    []  Plan of care initiated. Plan to see patient 2 times per week for 6 weeks to address the treatment planned outlined above.   [x]  Continue with current plan of care  []  Modify plan of care as follows:    []  Hold pending physician visit  []  Discharge    Time In 1600   Time Out 1632   Timed Code Minutes: 32 min   Total Treatment Time: 32 min       Electronically Signed by:  Bre HERNANDEZ #[id number]

## 2022-10-21 ENCOUNTER — HOSPITAL ENCOUNTER (OUTPATIENT)
Dept: OCCUPATIONAL THERAPY | Age: 42
Setting detail: THERAPIES SERIES
End: 2022-10-21
Payer: COMMERCIAL

## 2022-10-24 ENCOUNTER — HOSPITAL ENCOUNTER (OUTPATIENT)
Dept: OCCUPATIONAL THERAPY | Age: 42
Setting detail: THERAPIES SERIES
Discharge: HOME | End: 2022-10-24
Payer: COMMERCIAL

## 2022-10-24 PROCEDURE — 97035 APP MDLTY 1+ULTRASOUND EA 15: CPT

## 2022-10-24 PROCEDURE — 97110 THERAPEUTIC EXERCISES: CPT

## 2022-10-24 NOTE — PROGRESS NOTES
[address] THER[address]  [] EVALUATION  [x] DAILY NOTE (LAND) [] DAILY NOTE (AQUATIC ) [] PROGRESS NOTE [] DISCHARGE NOTE    [] [address] [facility] [place]   [] [place] [address]    [] [address] [facility]   [x] Linda Espinosa    Date: 10/24/2022  Patient Name:  Nelson Da Silva  : 1980  MRN: [id number]  CSN: [id number]    Referring Practitioner Wisam Dan MD   Diagnosis Other specified postprocedural states [Z98.890]   Carpal tunnel syndrome, unspecified upper limb [G56.00]  Lesion of ulnar nerve, unspecified upper limb [G56.20]  Osteoarthritis of first carpometacarpal joint, unspecified [M18.9]    Treatment Diagnosis Pain in left thumb and wrist, decreased sensation left hand, decreased left  and pinch strength   Date of Evaluation 10/13/22      Functional Outcome Measure Used Upper Extremity Functional Scale   Functional Outcome Score 68/80  (10/13/22)       Insurance: Primary: Payor: UMR /  /  / ,   Secondary:    Authorization Information: 60 visits per calendar year PT/OT/ST combined - none used, aquatics not covered, modalities covered, no massage   Visit # 3, 3/10 for progress note   Visits Allowed: 60   Recertification Date: 2022   Physician Follow-Up: Approximately 2 weeks from now   Physician Orders: Script for eval and treat. ADLs, functional activities, manual therapy techniques, therapeutic exercises, HEP, modalities as needed, ROM, strengthening   Pertinent History: Patient states pain in her left forearm and thumb joint started to get worse in 2022 when she started to do a more repetitive job. 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 Patient reports she had cortisone injection in her thumb joint on 22. Patient states testing showed arthritis in her thumb and carpal tunnel syndrome. Past medical history includes epilepsy, anxiety disorder. SUBJECTIVE: Patient reports achy in thumb/wrist area. OBJECTIVE:  TREATMENT   Precautions: none per patient    Pain: 4/10 left thumb, wrist     X in shaded column indicates Activity Completed Today   Modalities Parameters/  Location  Notes/Comments   Ultasound Left thenar area/radial wrist 50% pulsed ultrasound 3.3 MHz, 0.8 mari/cm2, x 8 minutes X                Manual Therapy Time/  Technique  Notes/Comments   PROM L wrist all motions, Thumb ROM all motions to tolerance   X    Thenar eminence press   X Tightness and minor discomfort    STM manually and with IASTM tools to thenar eminence  X Mild crepitus noted   Exercises   Sets/  Sec Reps  Notes/Comments   Thumb IP/MP Blocking 1 10 ea X    Thumb Flexion/extension, radial/palmar abduction 1 10 ea X    Hook Fist, Long Fist  1 10 ea X    Wrist flexion/extension with digits straight and with digits extended, UD/RD 1 10 ea X    Wrist PREs 2# over towel roll 2 sec 10 ea X Completed in supination and pronation, UD/RD   [employer] Clothespins- isometric hold 2 point and lateral pinch  2 sec 10 ea X           Activities Time    Notes/Comments                       Specific Interventions Next Treatment: ultrasound for pain control, STM, /pinch strengthening, kinesiotaping, nerve glides, HEP    Activity/Treatment Tolerance:  [x]  Patient tolerated treatment well  []  Patient limited by fatigue  []  Patient limited by pain   []  Patient limited by other medical complications  []  Other:     Assessment: Patient is progressing towards her goals. Mild crepitus noted in thenar eminence with IASTM tools. Areas for Improvement: edema, impaired ROM, impaired sensation, impaired strength, and pain  Prognosis: good    GOALS:  Patient Goal: Increase strength. Decrease pain to  better. Short Term Goals:  Time Frame: 5 weeks   Patient will be independent with UE HEP for increased ease with grasping objects for inspection at work.   Patient will report pain in left hand/wrist no greater than 2/10 with use. Patient will increase left  strength to 55, left tip pinch to 8 and lateral pinch to 10# for increased ease with opening jars and packages. Patient will increase AROM left thumb radial abduction to 50 degrees for increased ease with grasping and opening jars/containers. Long Term Goals:  Time Frame: 6 weeks  1. Patient will improve UEFS to at least 73.  2.  Patient will report no more than 1/10 pain in left hand/thumb with opening bottles and grasping tasks at work. Patient Education:   [x]  HEP/Education Completed: Plan of Care, Goals, purpose of ultrasound  [employer] Access Code for HEP: Access Code: [email or web address]  URL: [email or web address]  Date: 10/13/2022  Prepared by: Duke Lantigua    Exercises  Wrist Prayer Stretch - 3 x daily - 7 x weekly - 1 sets - 5 reps - 19 hold  Seated Wrist Flexor Hook Fist Tendon Gliding - 3 x daily - 7 x weekly - 1 sets - 10 reps  Seated Wrist Flexor Full Fist Tendon Gliding - 3 x daily - 7 x weekly - 1 sets - 10 reps  Seated Straight Fist AROM - 3 x daily - 7 x weekly - 1 sets - 10 reps  Thumb Abduction AROM on Table - 3 x daily - 7 x weekly - 1 sets - 10 reps  Purpose of IASTM tools, new schedule given  []  No new Education completed  [x]  Reviewed Prior HEP      [x]  Patient verbalized and/or demonstrated understanding of education provided. []  Patient unable to verbalize and/or demonstrate understanding of education provided. Will continue education. [x]  Barriers to learning: none    PLAN:  Treatment Recommendations: Strengthening, Range of Motion, Manual Therapy - Soft Tissue Mobilization, Home Exercise Program, Modalities, and Kinesiotaping    []  Plan of care initiated. Plan to see patient 2 times per week for 6 weeks to address the treatment planned outlined above.   [x]  Continue with current plan of care  []  Modify plan of care as follows:    []  Hold pending physician visit  []  Discharge    Time In 1517   Time Out 1550   Timed Code Minutes: 33 min   Total Treatment Time: 33 min       Electronically Signed by: . Guido Chin, OTR/L #[id number] DISPLAY PLAN FREE TEXT

## 2022-11-01 ENCOUNTER — HOSPITAL ENCOUNTER (OUTPATIENT)
Dept: OCCUPATIONAL THERAPY | Age: 42
Setting detail: THERAPIES SERIES
Discharge: HOME | End: 2022-11-01
Payer: COMMERCIAL

## 2022-11-01 PROCEDURE — 97140 MANUAL THERAPY 1/> REGIONS: CPT

## 2022-11-01 PROCEDURE — 97110 THERAPEUTIC EXERCISES: CPT

## 2022-11-01 PROCEDURE — 97035 APP MDLTY 1+ULTRASOUND EA 15: CPT

## 2022-11-01 NOTE — PROGRESS NOTES
[address]  [] EVALUATION  [x] DAILY NOTE (LAND) [] DAILY NOTE (AQUATIC ) [] PROGRESS NOTE [] DISCHARGE NOTE    [] [facility]   [] [facility]     [] [facility]   [x] Homar Rizvi    Date: 2022  Patient Name:  Stevie Richter  : 1980  MRN: [id number]  CSN: [id number]    Referring Practitioner Beula Cockayne., MD   Diagnosis Other specified postprocedural states [Z98.890]   Carpal tunnel syndrome, unspecified upper limb [G56.00]  Lesion of ulnar nerve, unspecified upper limb [G56.20]  Osteoarthritis of first carpometacarpal joint, unspecified [M18.9]    Treatment Diagnosis Pain in left thumb and wrist, decreased sensation left hand, decreased left  and pinch strength   Date of Evaluation 10/13/22      Functional Outcome Measure Used Upper Extremity Functional Scale   Functional Outcome Score 68/80  (10/13/22)       Insurance: Primary: Payor: UMR /  /  / ,   Secondary:    Authorization Information: 60 visits per calendar year PT/OT/ST combined - none used, aquatics not covered, modalities covered, no massage   Visit # 4, 410 for progress note   Visits Allowed: 60   Recertification Date: 2022   Physician Follow-Up: Approximately 2 weeks from now   Physician Orders: Script for eval and treat. ADLs, functional activities, manual therapy techniques, therapeutic exercises, HEP, modalities as needed, ROM, strengthening   Pertinent History: Patient states pain in her left forearm and thumb joint started to get worse in 2022 when she started to do a more repetitive job. 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 Patient reports she had cortisone injection in her thumb joint on 22. Patient states testing showed arthritis in her thumb and carpal tunnel syndrome. Past medical history includes epilepsy, anxiety disorder. SUBJECTIVE: Patient states nothing new with left thumb. OBJECTIVE:  TREATMENT   Precautions: none per patient    Pain: 3/10 left thumb, wrist     X in shaded column indicates Activity Completed Today   Modalities Parameters/  Location  Notes/Comments   Ultasound Left thenar area/radial wrist 50% pulsed ultrasound 3.3 MHz, 0.8 mari/cm2, x 8 minutes X                Manual Therapy Time/  Technique  Notes/Comments   PROM L wrist all motions, Thumb ROM all motions to tolerance   X    Thenar eminence press   X    STM manually and with IASTM tools to thenar eminence  X Mild crepitus noted   Exercises   Sets/  Sec Reps  Notes/Comments   Thumb IP/MP Blocking 1 10 ea X    Thumb Flexion/extension, radial/palmar abduction 1 10 ea X    Hook Fist, Long Fist  1 10 ea X    Wrist flexion/extension with digits straight and with digits extended, UD/RD 1 12 ea X    Wrist PREs 2# over towel roll, elbow flexion/extension 2 sec 12 ea X Completed in supination and pronation, UD/RD   Meriam Lalo Clothespins- isometric hold 2 point and lateral pinch  2 sec 10 ea X    Orange putty for left gripping and pinching  3 minutes X Initiated, used 3 point pinch today   Activities Time    Notes/Comments                       Specific Interventions Next Treatment: ultrasound for pain control, STM, /pinch strengthening, kinesiotaping, nerve glides, HEP    Activity/Treatment Tolerance:  [x]  Patient tolerated treatment well  []  Patient limited by fatigue  []  Patient limited by pain   []  Patient limited by other medical complications  []  Other:     Assessment: Patient is progressing towards her goals. Mild crepitus noted in thenar eminence with IASTM tools. Areas for Improvement: edema, impaired ROM, impaired sensation, impaired strength, and pain  Prognosis: good    GOALS:  Patient Goal: Increase strength. Decrease pain to  better.     Short Term Goals:  Time Frame: 5 weeks   Patient will be independent with UE HEP for increased ease with grasping objects for inspection at work. Patient will report pain in left hand/wrist no greater than 2/10 with use. Patient will increase left  strength to 55, left tip pinch to 8 and lateral pinch to 10# for increased ease with opening jars and packages. Patient will increase AROM left thumb radial abduction to 50 degrees for increased ease with grasping and opening jars/containers. Long Term Goals:  Time Frame: 6 weeks  1. Patient will improve UEFS to at least 73.  2.  Patient will report no more than 1/10 pain in left hand/thumb with opening bottles and grasping tasks at work. Patient Education:   []  HEP/Education Completed: Plan of Care, Goals, purpose of ultrasound  [email or web address] Access Code for HEP: Access Code: [email or web address]  URL: [email or web address]  Date: 10/13/2022  Prepared by: Jarrett Iglesias    Exercises  Wrist Prayer Stretch - 3 x daily - 7 x weekly - 1 sets - 5 reps - 19 hold  Seated Wrist Flexor Hook Fist Tendon Gliding - 3 x daily - 7 x weekly - 1 sets - 10 reps  Seated Wrist Flexor Full Fist Tendon Gliding - 3 x daily - 7 x weekly - 1 sets - 10 reps  Seated Straight Fist AROM - 3 x daily - 7 x weekly - 1 sets - 10 reps  Thumb Abduction AROM on Table - 3 x daily - 7 x weekly - 1 sets - 10 reps  Purpose of IASTM tools, new schedule given  []  No new Education completed  [x]  Reviewed Prior HEP      [x]  Patient verbalized and/or demonstrated understanding of education provided. []  Patient unable to verbalize and/or demonstrate understanding of education provided. Will continue education. [x]  Barriers to learning: none    PLAN:  Treatment Recommendations: Strengthening, Range of Motion, Manual Therapy - Soft Tissue Mobilization, Home Exercise Program, Modalities, and Kinesiotaping    []  Plan of care initiated. Plan to see patient 2 times per week for 6 weeks to address the treatment planned outlined above.   [x]  Continue with current plan of care  []  Modify plan of care as follows:    []  Hold pending physician visit  []  Discharge    Time In 1454   Time Out 1532   Timed Code Minutes: 38 min   Total Treatment Time: 38 min       Electronically Signed by: . DARRELL Conn/TITI #[id number]

## 2023-03-14 ENCOUNTER — HOSPITAL ENCOUNTER (OUTPATIENT)
Dept: OCCUPATIONAL THERAPY | Age: 43
Setting detail: THERAPIES SERIES
Discharge: HOME | End: 2023-03-14
Payer: COMMERCIAL

## 2023-03-14 PROCEDURE — 97165 OT EVAL LOW COMPLEX 30 MIN: CPT

## 2023-03-14 PROCEDURE — 97110 THERAPEUTIC EXERCISES: CPT

## 2023-03-14 NOTE — PROGRESS NOTES
** PLEASE SIGN, DATE AND TIME CERTIFICATION BELOW AND RETURN TO [facility] OUTPATIENT REHABILITATION (FAX #: [phone]). ATTEST/CO-SIGN IF ACCESSING VIA INFOLUP. THANK YOU.**    I certify that I have examined the patient below and determined that Physical Medicine and Rehabilitation service is necessary and that I approve the established plan of care for up to 90 days or as specifically noted.   Attestation, signature or co-signature of physician indicates approval of certification requirements.    ________________________ ____________ __________  Physician Signature   Date   Time   [address]  [x] EVALUATION  [] DAILY NOTE (LAND) [] DAILY NOTE (AQUATIC ) [] PROGRESS NOTE [] DISCHARGE NOTE    [] [facility]   [] [place] [address]    [] [facility]   [x] [facility]    Date: 3/14/2023  Patient Name:  Brianne Redman  : 1980  MRN: [id number]  CSN: [id number]    Referring Practitioner JAN Temple   Diagnosis Carpal tunnel syndrome, unspecified upper limb [G56.00]  Lesion of ulnar nerve, unspecified upper limb [G56.20]  Osteoarthritis of first carpometacarpal joint, unspecified [M18.9]    Treatment Diagnosis L hand pain, weakness, stiffness s/p L thumb CMC arthroplasty, L CTR, and L ulnar nerve transposition   Date of Evaluation 3/14/23      Functional Outcome Measure Used UEFI   Functional Outcome Score 15/80 (3/14/23)       Insurance: Primary: Payor: R /  /  / ,   Secondary:    Authorization Information: PRE CERTIFICATION REQUIRED: NO  INSURANCE THERAPY BENEFIT:  60 VISITS OF PT/OT/ST COMBINED -SOFTMAX -NONE USED  AQUATIC THERAPY COVERED: YES  MODALITIES COVERED:  YES, NO IONTO, NO MASSAGE   Visit # 1, 1/10 for progress note   Visits Allowed: 60 per year   Recertification Date: 2023   Physician Follow-Up: 2023   Physician Orders: Sharyle Boss and tx, ADL, joint mobs,    Pertinent History: Pt is 42 y/o F with hx of L hand pain presents to therapy s/p L thumb CMC arthroplasty, L CTR, and L ulnar nerve transposition on 2/15/23. Pt seen by her surgeon ~1 week ago who had her stitches removed, per pt stated pt healing as expected, and referred pt to OT for eval and tx. Pt  has a past medical history of Seizures ([address] and Varicosities. SUBJECTIVE: Pt is pleasant and cooperative    Social/Functional History:  Medications and Allergies have been reviewed and are listed on the [facility] [employer]  boyfriend and kids    Task Prior Level of Function  (current level of function addressed below)   ADLs  Independent   Ambulation Independent   Transfers Independent   Hobbies Sam   Driving Active    Work [employer]. Occupation: [employer], currently off work , lifts at least 30 lbs     OBJECTIVE:  Hand Dominance right handed   Palpation    Observation Healing scars/surgical incisions present at base of thumb/CMCJ, over carpal tunnel, volar forearm, and medial elbow, min edema visible in thenar eminence   Posture    Edema    Special Tests        ADL's Pt reports she has some difficulty with nearly all ADL and IADL tasks, especially doing her hair, putting on bra, and putting on jeans. Pt reports she can do laundry but is unable to fold and has been limited in her ability to sweep and clean her home using her L hand to assist. Pt is currently off work due to post op restrictions per physician   Bed Mobility     Transfers    Balance        Sensation Left Upper Extremity: Intact.    Coordination 9 hole peg R hand: 21.08 L hand 32.85           LEFT UPPER EXTREMITY  RANGE OF MOTION    Right Left COMMENTS         Shoulder Flexion      Shoulder Extension      Shoulder Abduction      Shoulder Adduction      Shoulder External Rotation      Shoulder Internal Rotation      Shoulder Range of Motion is [facility]  []      Elbow Flexion  140    Elbow Extension  0    Forearm Pronation  75 Forearm Supination  75    Elbow Range of Motion is [facility]  [x]      Wrist Flexion 78 60    Wrist Extension 70 57    Wrist Radial Deviation 17 3    Wrist Ulnar Deviation 42 30    Wrist Range of Motion is WFL  []   If no measurement is recorded, no formal assessment was completed for that motion. LEFT UPPER EXTREMITY  HAND RANGE OF MOTION    Right Left COMMENTS   Thump MP 67 26    Thumb IP 67 25    Thumb Radial Abduction/Adduction 51 39    Thumb Palmar Abduction/Adduction 50 45    Thumb Opposition DPC of SF Tip of SF* *pain reported      Index Finger MP      Index Finger PIP      Index Finger DIP      Index Finger Total Motion  Full ROM       Long Finger MP      Long Finger PIP      Long Finger DIP      Long Finger Total Motion  Full ROM       Ring Finger MP      Ring Finger PIP      Ring Finger DIP      Ring Finger Total Motion  Full ROM       Little Finger MP      Little Finger PIP      Little Finger DIP      Little Finger Total Motion  Full ROM    If no measurement is recorded, no formal assessment was completed for that motion       TREATMENT   Precautions:  All ROM permitted, No resisted use of L hand x6 weeks PO, brace on at all times other than exercises and hygiene   Pain:  0/10 at rest, increased pain with AROM this date    X in shaded column indicates Activity Completed Today   Modalities Parameters/  Location  Notes/Comments                     Manual Therapy Time/  Technique  Notes/Comments                     Exercises   Sets/  Sec Reps  Notes/Comments   HEP instruction and completion (see below)   x See below                                             Activities Time    Notes/Comments                       Specific Interventions Next Treatment: therex within fluidotherapy, scar mobilizations, STM, progress ROM, strengthening when appropriate per protocol, [facility] training    Activity/Treatment Tolerance:  [x]  Patient tolerated treatment well  []  Patient limited by fatigue  []  Patient limited by pain   []  Patient limited by other medical complications  []  Other:     Assessment: Pt referred to therapy s/p L thumb CMC arthroplasty, CTR, and ulnar nerve transposition. Pt demonstrates decreased ROM, strength, and increased pain with healing scars and orders for splint wearing other than ex's and hygiene. Pt is very limited in use of L hand for ADL and IADL performance and pt is off work due to these limitations in her ability to complete her job tasks. Pt would benefit from skilled therapy 2x per week x8 weeks to address these impairments and improved ability to complete ADLs/IADLs with increased independence and ease and to prepare for return to work  Areas for Improvement: edema, impaired ROM, impaired strength, pain, and restricted weight bearing status  Prognosis: good    GOALS:  Patient Goal: return to work, complete ADLs independently    Short Term Goals:  Time Frame: 4 weeks  *  Patient will demonstrate I knowledge of HEP for improved flexibility and strength for lifting. * Patient will improve AROM of R wrist ext to >65, wrist flexion to > 70, thumb opposition to base of thumb, and radial abduction to >38 to be able to complete upper body dressing tasks independently. *  Pt will report ability to do her hair with <2/10 pain. *Pt will report ability to fold laundry without difficulty. Long Term Goals:  Time Frame: 8 weeks  *  Patient will improve UEFS to at least 38  * Patient will increase  strength to >25 lbs, lateral and three point pinch to >5 lbs for increased ability to return to open jars and containers  *  Patient will be able to lift 30 lb crate from floor to table for increased ability to return to work. *Pt will improve 9 hole peg test time to <29 seconds for increased ability to manipulate objects in her hand for management of her medications.     Patient Education:   [x]  HEP/Education Completed: Plan of Care, Goals, HEP,  Medbridge Access Code for HEP:   Tendon gliding ex's for carpal tunnel, elbow flex/ext for ulnar nerve gliding, gentle radial abduction stretch, opposition thumb ext AROM, and lieberman ab/adduction AROM for L thumb, scar mobilizations at home, precautions post op, encouraged pt to be consistent with her HEP especially as she is on vacation for the next ~1.5 weeks and will not be back to therapy for 2 weeks  []  No new Education completed  []  Reviewed Prior HEP      [x]  Patient verbalized and/or demonstrated understanding of education provided. []  Patient unable to verbalize and/or demonstrate understanding of education provided. Will continue education. [x]  Barriers to learning: none    PLAN:  Treatment Recommendations: Strengthening, Range of Motion, Neuromuscular Re-education, Manual Therapy - Soft Tissue Mobilization, Manual Therapy - Joint Manipulation, Pain Management, Home Exercise Program, Patient Education, Safety Education and Training, Self-Care Education and Training, Modalities, and Splint Fabrications/Modifications    [x]  Plan of care initiated. Plan to see patient 2 times per week for 8 weeks to address the treatment planned outlined above.   []  Continue with current plan of care  []  Modify plan of care as follows:    []  Hold pending physician visit  []  Discharge    Time In 1133   Time Out 1225   Timed Code Minutes: 13 min   Total Treatment Time: 48 min       Electronically Signed by: Lindsey Dempsey OT

## 2023-03-28 ENCOUNTER — HOSPITAL ENCOUNTER (OUTPATIENT)
Dept: OCCUPATIONAL THERAPY | Age: 43
Setting detail: THERAPIES SERIES
Discharge: HOME | End: 2023-03-28
Payer: COMMERCIAL

## 2023-03-28 PROCEDURE — 97110 THERAPEUTIC EXERCISES: CPT

## 2023-03-28 PROCEDURE — 97022 WHIRLPOOL THERAPY: CPT

## 2023-03-28 NOTE — PROGRESS NOTES
education. [x]  Barriers to learning: none    PLAN:  Treatment Recommendations: Strengthening, Range of Motion, Neuromuscular Re-education, Manual Therapy - Soft Tissue Mobilization, Manual Therapy - Joint Manipulation, Pain Management, Home Exercise Program, Patient Education, Safety Education and Training, Self-Care Education and Training, Modalities, and Splint Fabrications/Modifications    [x]  Plan of care initiated. Plan to see patient 2 times per week for 8 weeks to address the treatment planned outlined above.   []  Continue with current plan of care  []  Modify plan of care as follows:    []  Hold pending physician visit  []  Discharge    Time In 0845   Time Out 0935   Timed Code Minutes: 50 min   Total Treatment Time: 50 min       Electronically Signed by: MARY Malloy/TITI, CHT, #[id number]

## 2023-03-31 ENCOUNTER — HOSPITAL ENCOUNTER (OUTPATIENT)
Dept: OCCUPATIONAL THERAPY | Age: 43
Setting detail: THERAPIES SERIES
Discharge: HOME | End: 2023-03-31
Payer: COMMERCIAL

## 2023-03-31 PROCEDURE — 97022 WHIRLPOOL THERAPY: CPT

## 2023-03-31 PROCEDURE — 97140 MANUAL THERAPY 1/> REGIONS: CPT

## 2023-03-31 PROCEDURE — 97110 THERAPEUTIC EXERCISES: CPT

## 2023-03-31 NOTE — PROGRESS NOTES
Strengthening Stabilization CMC First Dorsal Interossei  - 1 x daily - 7 x weekly - 3 sets - 10 reps  - Ulnar Nerve Flossing  - 1 x daily - 7 x weekly - 3 sets - 10 reps  []  No new Education completed  []  Reviewed Prior HEP      [x]  Patient verbalized and/or demonstrated understanding of education provided. []  Patient unable to verbalize and/or demonstrate understanding of education provided. Will continue education. [x]  Barriers to learning: none    PLAN:  Treatment Recommendations: Strengthening, Range of Motion, Neuromuscular Re-education, Manual Therapy - Soft Tissue Mobilization, Manual Therapy - Joint Manipulation, Pain Management, Home Exercise Program, Patient Education, Safety Education and Training, Self-Care Education and Training, Modalities, and Splint Fabrications/Modifications    [x]  Plan of care initiated. Plan to see patient 2 times per week for 8 weeks to address the treatment planned outlined above.   []  Continue with current plan of care  []  Modify plan of care as follows:    []  Hold pending physician visit  []  Discharge    Time In 0820   Time Out 0910   Timed Code Minutes: 35 min   Total Treatment Time: 50 min       [address], OTR/L [id number]

## 2023-04-04 ENCOUNTER — HOSPITAL ENCOUNTER (OUTPATIENT)
Dept: OCCUPATIONAL THERAPY | Age: 43
Setting detail: THERAPIES SERIES
Discharge: HOME | End: 2023-04-04
Payer: COMMERCIAL

## 2023-04-04 PROCEDURE — 97140 MANUAL THERAPY 1/> REGIONS: CPT

## 2023-04-04 PROCEDURE — 97022 WHIRLPOOL THERAPY: CPT

## 2023-04-04 PROCEDURE — 97110 THERAPEUTIC EXERCISES: CPT

## 2023-04-04 NOTE — PROGRESS NOTES
she is on vacation for the next ~1.5 weeks and will not be back to therapy for 2 weeks  Access Code: [email or web address]  - Thumb Radial Abduction with Rubber Band on Table  - 1 x daily - 7 x weekly - 3 sets - 10 reps  - Thumb Flexion with Resistance  - 1 x daily - 7 x weekly - 3 sets - 10 reps  - Thumb Strengthening Stabilization CMC First Dorsal Interossei  - 1 x daily - 7 x weekly - 3 sets - 10 reps  - Ulnar Nerve Flossing  - 1 x daily - 7 x weekly - 3 sets - 10 reps  []  No new Education completed  []  Reviewed Prior HEP      [x]  Patient verbalized and/or demonstrated understanding of education provided. []  Patient unable to verbalize and/or demonstrate understanding of education provided. Will continue education. [x]  Barriers to learning: none    PLAN:  Treatment Recommendations: Strengthening, Range of Motion, Neuromuscular Re-education, Manual Therapy - Soft Tissue Mobilization, Manual Therapy - Joint Manipulation, Pain Management, Home Exercise Program, Patient Education, Safety Education and Training, Self-Care Education and Training, Modalities, and Splint Fabrications/Modifications    []  Plan of care initiated. Plan to see patient 2 times per week for 8 weeks to address the treatment planned outlined above.   [x]  Continue with current plan of care  []  Modify plan of care as follows:    []  Hold pending physician visit  []  Discharge    Time In 0826   Time Out 0911   Timed Code Minutes: 35 min   Total Treatment Time: 50 min       [facility], [address] [place] [address], OTR/L [id number]

## 2023-04-07 ENCOUNTER — HOSPITAL ENCOUNTER (OUTPATIENT)
Dept: OCCUPATIONAL THERAPY | Age: 43
Setting detail: THERAPIES SERIES
Discharge: HOME | End: 2023-04-07
Payer: COMMERCIAL

## 2023-04-07 PROCEDURE — 97140 MANUAL THERAPY 1/> REGIONS: CPT

## 2023-04-07 PROCEDURE — 97022 WHIRLPOOL THERAPY: CPT

## 2023-04-07 PROCEDURE — 97110 THERAPEUTIC EXERCISES: CPT

## 2023-04-07 NOTE — PROGRESS NOTES
Interventions Next Treatment: therex within fluidotherapy, scar mobilizations, STM, progress ROM, strengthening when appropriate per protocol, [facility] training    Activity/Treatment Tolerance:  [x]  Patient tolerated treatment well  []  Patient limited by fatigue  []  Patient limited by pain   []  Patient limited by other medical complications  []  Other:     Assessment: Pt progressing toward goals, strength improving, preparing for surgery on her opposite hand  Areas for Improvement: edema, impaired ROM, impaired strength, pain, and restricted weight bearing status  Prognosis: good    GOALS:  Patient Goal: return to work, complete ADLs independently    Short Term Goals:  Time Frame: 4 weeks  *  Patient will demonstrate I knowledge of HEP for improved flexibility and strength for lifting. * Patient will improve AROM of R wrist ext to >65, wrist flexion to > 70, thumb opposition to base of thumb, and radial abduction to >38 to be able to complete upper body dressing tasks independently. *  Pt will report ability to do her hair with <2/10 pain. *Pt will report ability to fold laundry without difficulty. Long Term Goals:  Time Frame: 8 weeks  *  Patient will improve UEFS to at least 38  * Patient will increase  strength to >25 lbs, lateral and three point pinch to >5 lbs for increased ability to return to open jars and containers  *  Patient will be able to lift 30 lb crate from floor to table for increased ability to return to work. *Pt will improve 9 hole peg test time to <29 seconds for increased ability to manipulate objects in her hand for management of her medications.     Patient Education:   [x]  HEP/Education Completed: Plan of Care, Goals, HEP,  Medbridge Access Code for HEP:   Tendon gliding ex's for carpal tunnel, elbow flex/ext for ulnar nerve gliding, gentle radial abduction stretch, opposition thumb ext AROM, and lieberman ab/adduction AROM for L thumb, scar mobilizations at home, precautions post op,

## 2023-04-11 ENCOUNTER — APPOINTMENT (OUTPATIENT)
Dept: OCCUPATIONAL THERAPY | Age: 43
End: 2023-04-11
Payer: COMMERCIAL

## 2023-04-18 ENCOUNTER — HOSPITAL ENCOUNTER (OUTPATIENT)
Dept: OCCUPATIONAL THERAPY | Age: 43
Setting detail: THERAPIES SERIES
End: 2023-04-18
Payer: COMMERCIAL

## 2023-04-20 ENCOUNTER — HOSPITAL ENCOUNTER (OUTPATIENT)
Dept: OCCUPATIONAL THERAPY | Age: 43
Setting detail: THERAPIES SERIES
Discharge: HOME | End: 2023-04-20
Payer: COMMERCIAL

## 2023-04-20 PROCEDURE — 97018 PARAFFIN BATH THERAPY: CPT

## 2023-04-20 PROCEDURE — 97110 THERAPEUTIC EXERCISES: CPT

## 2023-04-20 NOTE — PROGRESS NOTES
other than exercises and hygiene   Pain:  L elbow pain due to overuse     X in shaded column indicates Activity Completed Today   Modalities Parameters/  Location  Notes/Comments   Fluidotherapy  15 min   L hand with L wrist/hand AROM    Paraffin tx to L hand with MHP 15 min x Pain relief noted with this         Manual Therapy Time/  Technique  Notes/Comments   STM all incisions, thenar eminence, first webspace, volar/dorsal forearm   x 4/20/23: L hand/forearm/elbow incisions, volar/dorsal forearm, tricep    IASTM and tuning fork to volar wrist scar that pt reports has been the most sore, likely adhesions      Kinesiotape applied for CMC support/pain relief- 3 strips  Mod-max tension at lateral CMC wrapping around thumb/radial hand with no tension  2 strips in first webspace with mod-max tension encouraging CMC abduction/MP flexion wrapping around thumb with no tension      Exercises   Sets/  Sec Reps  Notes/Comments   Joint blocking MP, PIP, DIP L hand digits/thumb 1 10     L thumb PROM MP PROM, composite MP/IP PROM with CMC supported 1 10     Tendon glides 1 10  3/31/23-demonstrates independence with HEP and good technique therefore did not complete in therapy today   Ulnar nerve glides    Given for HEP   Wrist/digit grasp and ROM exercise with dowel and rope to wind and unwind with 1.5#  2 rev up and down  Cues for slow controlled motion, added resistance today   Thumb CMC stability/strengthening ex's    1st dorsal interossei isometric, rubber band flex/opp and rubber band radial abduction, cues for technique, given for HEP   Prayer stretch for wrist ext/thumb radial abduction stretching and stretch to volar wrist scar       Manual wrist flex/ext stretching    Completed by therapist in place of prayer stretch due to R hand surgery   Thumb ext/abd passing thumb back and forth behind pen       Tripod grasp pen presses into orange theraputty trying to maintain open webspace 1 1 min  Increased resistance putty

## 2023-04-24 ENCOUNTER — HOSPITAL ENCOUNTER (OUTPATIENT)
Dept: OCCUPATIONAL THERAPY | Age: 43
Setting detail: THERAPIES SERIES
Discharge: HOME | End: 2023-04-24
Payer: COMMERCIAL

## 2023-04-24 PROCEDURE — 97018 PARAFFIN BATH THERAPY: CPT

## 2023-04-24 PROCEDURE — 97110 THERAPEUTIC EXERCISES: CPT

## 2023-04-24 PROCEDURE — 97140 MANUAL THERAPY 1/> REGIONS: CPT

## 2023-04-24 NOTE — PROGRESS NOTES
[address]  [] EVALUATION  [x] DAILY NOTE (LAND) [] DAILY NOTE (AQUATIC ) [] PROGRESS NOTE [] DISCHARGE NOTE    [] [facility]   [] [facility]    [] [facility]   [x] Samanta Slaughter    Date: 2023  Patient Name:  Gian Moya  : 1980  MRN: [id number]  CSN: [id number]    Referring Practitioner JAN Underwood   Diagnosis Carpal tunnel syndrome, unspecified upper limb [G56.00]  Lesion of ulnar nerve, unspecified upper limb [G56.20]  Osteoarthritis of first carpometacarpal joint, unspecified [M18.9]    Treatment Diagnosis L hand pain, weakness, stiffness s/p L thumb CMC arthroplasty, L CTR, and L ulnar nerve transposition   Date of Evaluation 3/14/23      Functional Outcome Measure Used UEFI   Functional Outcome Score 1580 (3/14/23)       Insurance: Primary: Payor: UMR /  /  / ,   Secondary:    Authorization Information: PRE CERTIFICATION REQUIRED: NO  INSURANCE THERAPY BENEFIT:  60 VISITS OF PT/OT/ST COMBINED -SOFTMAX -NONE USED  AQUATIC THERAPY COVERED: YES  MODALITIES COVERED:  YES, NO IONTO, NO MASSAGE   Visit # 8, 8/10 for progress note   Visits Allowed: 60 per year   Recertification Date: 2023   Physician Follow-Up: May 2, 2023   Physician Orders: Glendy Cunha and tx, ADL, joint mobs,    Pertinent History: Pt is 42 y/o F with hx of L hand pain presents to therapy s/p L thumb CMC arthroplasty, L CTR, and L ulnar nerve transposition on 2/15/23. Pt seen by her surgeon ~1 week ago who had her stitches removed, per pt stated pt healing as expected, and referred pt to OT for eval and tx. Scheduled for R [facility] arthroplasty on 4/10/23    Pt  has a past medical history of Seizures ([address] and Varicosities. SUBJECTIVE: Patient is 2 week post on her R hand, same procedure as her L. Patient reports she wishes she did not have the R done, reports not as easy healing process versus her L.  Patient

## 2023-04-27 ENCOUNTER — HOSPITAL ENCOUNTER (OUTPATIENT)
Dept: OCCUPATIONAL THERAPY | Age: 43
Setting detail: THERAPIES SERIES
Discharge: HOME | End: 2023-04-27
Payer: COMMERCIAL

## 2023-04-27 PROCEDURE — 97110 THERAPEUTIC EXERCISES: CPT

## 2023-04-27 PROCEDURE — 97018 PARAFFIN BATH THERAPY: CPT

## 2023-04-27 PROCEDURE — 97140 MANUAL THERAPY 1/> REGIONS: CPT

## 2023-04-27 NOTE — PROGRESS NOTES
[address]  [] EVALUATION  [x] DAILY NOTE (LAND) [] DAILY NOTE (AQUATIC ) [] PROGRESS NOTE [] DISCHARGE NOTE    [] [facility]   [] [facility]    [] [facility]   [x] Freddy See    Date: 2023  Patient Name:  Spencer Turcios  : 1980  MRN: [id number]  CSN: [id number]    Referring Practitioner JAN Acevedo   Diagnosis Carpal tunnel syndrome, unspecified upper limb [G56.00]  Lesion of ulnar nerve, unspecified upper limb [G56.20]  Osteoarthritis of first carpometacarpal joint, unspecified [M18.9]    Treatment Diagnosis L hand pain, weakness, stiffness s/p L thumb CMC arthroplasty, L CTR, and L ulnar nerve transposition   Date of Evaluation 3/14/23      Functional Outcome Measure Used UEFI   Functional Outcome Score 1580 (3/14/23)       Insurance: Primary: Payor: UMR /  /  / ,   Secondary:    Authorization Information: PRE CERTIFICATION REQUIRED: NO  INSURANCE THERAPY BENEFIT:  60 VISITS OF PT/OT/ST COMBINED -SOFTMAX -NONE USED  AQUATIC THERAPY COVERED: YES  MODALITIES COVERED:  YES, NO IONTO, NO MASSAGE   Visit # 9,  9/10 for progress note   Visits Allowed: 60 per year   Recertification Date: 2023   Physician Follow-Up: May 2, 2023   Physician Orders: Huang Laws and tx, ADL, joint mobs,    Pertinent History: Pt is 44 y/o F with hx of L hand pain presents to therapy s/p L thumb CMC arthroplasty, L CTR, and L ulnar nerve transposition on 2/15/23. Pt seen by her surgeon ~1 week ago who had her stitches removed, per pt stated pt healing as expected, and referred pt to OT for eval and tx. Scheduled for R [facility] [place] arthroplasty on 4/10/23    Pt  has a past medical history of Seizures ([address] and Varicosities. SUBJECTIVE: Patient reports continued constant achy discomfort noted throughout L thumb- reports trying to use cryotherapy for pain.  Patient reports she is really frustrated with R hand,

## 2023-05-02 ENCOUNTER — HOSPITAL ENCOUNTER (OUTPATIENT)
Dept: OCCUPATIONAL THERAPY | Age: 43
Setting detail: THERAPIES SERIES
Discharge: HOME | End: 2023-05-02
Payer: COMMERCIAL

## 2023-05-02 PROCEDURE — 97110 THERAPEUTIC EXERCISES: CPT

## 2023-05-02 PROCEDURE — 97018 PARAFFIN BATH THERAPY: CPT

## 2023-05-02 NOTE — PROGRESS NOTES
Thumb CMC stretch/strengthening exercise maintaining hold around free-up container with orange theraband pull from therapist 10 sec 10     [facility] Presses and twists in [place] putty 1 10 ea     Wrist flex/ext with elbow extended  Fist/wrist flex -> wrist/digit ext with elbow extended  1 10 ea     Bicep curl, tricep overhead, forearm pro/sup, wrist flex/ext  1 10 ea  2#  no pain noted- min clicking noted tricep extension this date    Large grippers- red 5 sec 10     Red clothespins- 2 point and lateral isometrics- to tolerance  5 sec 10 ea  Initiated this date. Press to tolerance with 5 sec hold, overall fair tolerance noted. Activities Time    Notes/Comments   Desensitization via vibration and desensitization stick rubs (multiple textures) 3 min  Vibration to all scars    In-hand manip with mastermind pegs translations with stabilization followed by placement of pegs in pegboard  Tweezers with coban to remove from board   Only 1. This date with 10 mastermind pegs    L hand in hand manipulation with pocketing coin change, then removing and stacking in piles to tolerance  10 coins each round x 2 rounds  Good pocketing ability with FM, good challenge noted with stacking coins and manipulation with translation    Measurements taken and goals assessed for PN  x      Specific Interventions Next Treatment: therex within fluidotherapy, scar mobilizations, STM, progress ROM, strengthening when appropriate per protocol, [facility] training    Activity/Treatment Tolerance:  [x]  Patient tolerated treatment well  []  Patient limited by fatigue  []  Patient limited by pain   []  Patient limited by other medical complications  []  Other:     Assessment: PN completed this date. See below for measurements. Pt has improved L wrist and thumb AROM, L hand coordination, and L /pinch strength.   Pt also reports improved independence with ADL/IADL but does continue to have difficulty due to L hand/wrist pain and limited

## 2023-05-05 ENCOUNTER — HOSPITAL ENCOUNTER (OUTPATIENT)
Dept: OCCUPATIONAL THERAPY | Age: 43
Setting detail: THERAPIES SERIES
Discharge: HOME | End: 2023-05-05
Payer: COMMERCIAL

## 2023-05-05 PROCEDURE — 97110 THERAPEUTIC EXERCISES: CPT

## 2023-05-05 PROCEDURE — 97140 MANUAL THERAPY 1/> REGIONS: CPT

## 2023-05-05 PROCEDURE — 97018 PARAFFIN BATH THERAPY: CPT

## 2023-05-05 NOTE — PROGRESS NOTES
ea     Thumb CMC stretch/strengthening exercise maintaining hold around free-up container with orange theraband pull from therapist 10 sec 10 x    [facility] Presses and twists in [place] putty 1 10 ea     Wrist flex/ext with elbow extended  Fist/wrist flex -> wrist/digit ext with elbow extended  1 10 ea     Bicep curl, tricep overhead, forearm pro/sup, wrist flex/ext  1 10 ea x 2#  no pain noted- min clicking noted tricep extension this date    Large grippers- red 5 sec 10 x    Red clothespins- 2 point and lateral isometrics- to tolerance  5 sec 10 ea  Initiated this date. Press to tolerance with 5 sec hold, overall fair tolerance noted. Activities Time    Notes/Comments   Desensitization via vibration and desensitization stick rubs (multiple textures) 3 min  Vibration to all scars    In-hand manip with mastermind pegs translations with stabilization followed by placement of pegs in pegboard  Tweezers with coban to remove from board   Only 1. This date with 10 mastermind pegs    L hand in hand manipulation with pocketing coin change, then removing and stacking in piles to tolerance  10 coins each round x 2 rounds  Good pocketing ability with FM, good challenge noted with stacking coins and manipulation with translation    Measurements taken and goals assessed for PN        Specific Interventions Next Treatment: therex within fluidotherapy, scar mobilizations, STM, progress ROM, strengthening when appropriate per protocol, [facility] training    Activity/Treatment Tolerance:  [x]  Patient tolerated treatment well  []  Patient limited by fatigue  []  Patient limited by pain   []  Patient limited by other medical complications  []  Other:     Assessment: Pt progressing nicely toward goals for L hand. Pt reports she is confident with her HEP and has more difficulty with her R hand now than her left.  Collaborated today to put pt on hold for her L hand at this time and will await orders for therapy for her R hand and

## 2023-05-09 ENCOUNTER — APPOINTMENT (OUTPATIENT)
Dept: OCCUPATIONAL THERAPY | Age: 43
End: 2023-05-09
Payer: COMMERCIAL

## 2023-05-12 ENCOUNTER — APPOINTMENT (OUTPATIENT)
Dept: OCCUPATIONAL THERAPY | Age: 43
End: 2023-05-12
Payer: COMMERCIAL

## 2023-05-19 ENCOUNTER — APPOINTMENT (OUTPATIENT)
Dept: OCCUPATIONAL THERAPY | Age: 43
End: 2023-05-19
Payer: COMMERCIAL

## 2023-05-23 ENCOUNTER — APPOINTMENT (OUTPATIENT)
Dept: OCCUPATIONAL THERAPY | Age: 43
End: 2023-05-23
Payer: COMMERCIAL

## 2023-05-26 ENCOUNTER — APPOINTMENT (OUTPATIENT)
Dept: OCCUPATIONAL THERAPY | Age: 43
End: 2023-05-26
Payer: COMMERCIAL

## 2023-06-06 ENCOUNTER — HOSPITAL ENCOUNTER (OUTPATIENT)
Dept: OCCUPATIONAL THERAPY | Age: 43
Setting detail: THERAPIES SERIES
Discharge: HOME | End: 2023-06-06
Payer: COMMERCIAL

## 2023-06-06 PROCEDURE — 97140 MANUAL THERAPY 1/> REGIONS: CPT

## 2023-06-06 PROCEDURE — 97110 THERAPEUTIC EXERCISES: CPT

## 2023-06-06 PROCEDURE — 97018 PARAFFIN BATH THERAPY: CPT

## 2023-06-06 PROCEDURE — 97168 OT RE-EVAL EST PLAN CARE: CPT

## 2023-06-06 NOTE — PROGRESS NOTES
** PLEASE SIGN, DATE AND TIME CERTIFICATION BELOW AND RETURN TO [facility] OUTPATIENT REHABILITATION (FAX #: [phone]). ATTEST/CO-SIGN IF ACCESSING VIA INTruckTrackET. THANK YOU.**    I certify that I have examined the patient below and determined that Physical Medicine and Rehabilitation service is necessary and that I approve the established plan of care for up to 90 days or as specifically noted.   Attestation, signature or co-signature of physician indicates approval of certification requirements.    ________________________ ____________ __________  Physician Signature   Date   Time  [address]  [x] RE-EVALUATION  [] DAILY NOTE (LAND) [] DAILY NOTE (AQUATIC ) [] PROGRESS NOTE [] DISCHARGE NOTE    [] [address] [place] [address]   [] [place] [address]    [] [facility]   [x] [facility]    Date: 2023  Patient Name:  Kenyetta Rojas  : 1980  MRN: [id number]  CSN: [id number]    Referring Practitioner Billie Cogan, PA   Diagnosis Carpal tunnel syndrome, unspecified upper limb [G56.00]  Lesion of ulnar nerve, unspecified upper limb [G56.20]  Osteoarthritis of first carpometacarpal joint, unspecified [M18.9]    Treatment Diagnosis L hand pain, weakness, stiffness s/p L thumb CMC arthroplasty, L CTR, and L ulnar nerve transposition  R hand pain, weakness, and stiffness s/p R thumb CMC arthroplasty and ECTR   Date of Evaluation 3/14/23, Re-eval 23      Functional Outcome Measure Used UEFI   Functional Outcome Score 15/80 L hand(3/14/23), 48/80 L hand (23) , 33/80 R hand (23)      Insurance: Primary: Payor: UMR /  /  / ,   Secondary:    Authorization Information: PRE CERTIFICATION REQUIRED: NO  INSURANCE THERAPY BENEFIT:  60 VISITS OF PT/OT/ST COMBINED -SOFTMAX -NONE USED  AQUATIC THERAPY COVERED: YES  MODALITIES COVERED:  YES, NO IONTO, NO MASSAGE   Visit # 12, 1/10 for PN, Re-eval completed to add R hand to POC

## 2023-06-09 ENCOUNTER — HOSPITAL ENCOUNTER (OUTPATIENT)
Dept: OCCUPATIONAL THERAPY | Age: 43
Setting detail: THERAPIES SERIES
Discharge: HOME | End: 2023-06-09
Payer: COMMERCIAL

## 2023-06-09 PROCEDURE — 97530 THERAPEUTIC ACTIVITIES: CPT

## 2023-06-09 PROCEDURE — 97110 THERAPEUTIC EXERCISES: CPT

## 2023-06-09 NOTE — PROGRESS NOTES
demonstrate understanding of education provided. Will continue education. [x]  Barriers to learning: none    PLAN:  Treatment Recommendations: Strengthening, Range of Motion, Neuromuscular Re-education, Manual Therapy - Soft Tissue Mobilization, Manual Therapy - Joint Manipulation, Pain Management, Home Exercise Program, Patient Education, Safety Education and Training, Self-Care Education and Training, Modalities, and Splint Fabrications/Modifications    []  Plan of care initiated. Plan to see patient 2 times per week for 8 weeks to address the treatment planned outlined above.   [x]  Continue with current plan of care  []  Modify plan of care as follows: Plan to see pt 2x per week x4-8 weeks to address the treatment plan outlined above for B UEs  []  Hold pending physician visit  []  Discharge    Time In 4006   Time Out 0915   Timed Code Minutes: 43 min   Total Treatment Time: Cody 46 min       [facility], [address] [facility], OTR/L [id number]

## 2023-06-21 ENCOUNTER — HOSPITAL ENCOUNTER (OUTPATIENT)
Dept: OCCUPATIONAL THERAPY | Age: 43
Setting detail: THERAPIES SERIES
Discharge: HOME | End: 2023-06-21
Payer: COMMERCIAL

## 2023-06-21 PROCEDURE — 97110 THERAPEUTIC EXERCISES: CPT

## 2023-06-21 PROCEDURE — 97530 THERAPEUTIC ACTIVITIES: CPT

## 2023-06-21 NOTE — PROGRESS NOTES
[address]  [] RE-EVALUATION  [x] DAILY NOTE (LAND) [] DAILY NOTE (AQUATIC ) [] PROGRESS NOTE [] DISCHARGE NOTE    [] OUTPATIENT REHABILITATION [facility]   [] [place] [address]    [] [facility]   [x] Franklin Bolanos    Date: 2023  Patient Name:  Seferino Diop  : 1980  MRN: [id number]  CSN: [id number]    Referring Practitioner JAN Valenzuela   Diagnosis Carpal tunnel syndrome, unspecified upper limb [G56.00]  Lesion of ulnar nerve, unspecified upper limb [G56.20]  Osteoarthritis of first carpometacarpal joint, unspecified [M18.9]    Treatment Diagnosis L hand pain, weakness, stiffness s/p L thumb CMC arthroplasty, L CTR, and L ulnar nerve transposition  R hand pain, weakness, and stiffness s/p R thumb CMC arthroplasty and ECTR   Date of Evaluation 3/14/23, Re-eval 23      Functional Outcome Measure Used UEFI   Functional Outcome Score 15/80 L hand(3/14/23), 48/80 L hand (23) , 33/80 R hand (23)      Insurance: Primary: Payor: UMR /  /  / ,   Secondary:    Authorization Information: PRE CERTIFICATION REQUIRED: NO  INSURANCE THERAPY BENEFIT:  60 VISITS OF PT/OT/ST COMBINED -SOFTMAX -NONE USED  AQUATIC THERAPY COVERED: YES  MODALITIES COVERED:  YES, NO IONTO, NO MASSAGE   Visit # 16, 5/10 for PN, Re-eval completed to add R hand to [address]   Visits Allowed: 60 per year   Recertification Date: 2023   Physician Follow-Up: 2023   Physician Orders: Tatiana Chatman and tx, ADL, joint mobs,    Pertinent History: Pt is 44 y/o F with hx of L hand pain presents to therapy s/p L thumb CMC arthroplasty, L CTR, and L ulnar nerve transposition on 2/15/23. Pt seen by her surgeon ~1 week ago who had her stitches removed, per pt stated pt healing as expected, and referred pt to OT for eval and tx. Pt underwent R thumb CMC arthroplasty, ECTR, and cubital tunnel release on 4/10/23.  Pt has new therapy orders for R hand

## 2023-06-23 ENCOUNTER — HOSPITAL ENCOUNTER (OUTPATIENT)
Dept: OCCUPATIONAL THERAPY | Age: 43
Setting detail: THERAPIES SERIES
Discharge: HOME | End: 2023-06-23
Payer: COMMERCIAL

## 2023-06-23 PROCEDURE — 97530 THERAPEUTIC ACTIVITIES: CPT

## 2023-06-23 PROCEDURE — 97110 THERAPEUTIC EXERCISES: CPT

## 2023-06-23 NOTE — PROGRESS NOTES
[address]  [] RE-EVALUATION  [x] DAILY NOTE (LAND) [] DAILY NOTE (AQUATIC ) [] PROGRESS NOTE [] DISCHARGE NOTE    [] OUTPATIENT REHABILITATION [facility]   [] [place] [address]    [] [facility]   [x] Darby Keller    Date: 2023  Patient Name:  Sukhdev Sprague  : 1980  MRN: [id number]  CSN: [id number]    Referring Practitioner JAN Garza   Diagnosis Carpal tunnel syndrome, unspecified upper limb [G56.00]  Lesion of ulnar nerve, unspecified upper limb [G56.20]  Osteoarthritis of first carpometacarpal joint, unspecified [M18.9]    Treatment Diagnosis L hand pain, weakness, stiffness s/p L thumb CMC arthroplasty, L CTR, and L ulnar nerve transposition  R hand pain, weakness, and stiffness s/p R thumb CMC arthroplasty and ECTR   Date of Evaluation 3/14/23, Re-eval 23      Functional Outcome Measure Used UEFI   Functional Outcome Score 15/80 L hand(3/14/23), 48/80 L hand (23) , 33/80 R hand (23)      Insurance: Primary: Payor: UMR /  /  / ,   Secondary:    Authorization Information: PRE CERTIFICATION REQUIRED: NO  INSURANCE THERAPY BENEFIT:  60 VISITS OF PT/OT/ST COMBINED -SOFTMAX -NONE USED  AQUATIC THERAPY COVERED: YES  MODALITIES COVERED:  YES, NO IONTO, NO MASSAGE   Visit # 17, 6/10 for PN, Re-eval completed to add R hand to [address]   Visits Allowed: 60 per year   Recertification Date: 2023   Physician Follow-Up: 2023   Physician Orders: Murlene [place] and tx, ADL, joint mobs,    Pertinent History: Pt is 44 y/o F with hx of L hand pain presents to therapy s/p L thumb CMC arthroplasty, L CTR, and L ulnar nerve transposition on 2/15/23. Pt seen by her surgeon ~1 week ago who had her stitches removed, per pt stated pt healing as expected, and referred pt to OT for eval and tx. Pt underwent R thumb CMC arthroplasty, ECTR, and cubital tunnel release on 4/10/23.  Pt has new therapy orders for R hand

## 2023-06-28 ENCOUNTER — HOSPITAL ENCOUNTER (OUTPATIENT)
Dept: OCCUPATIONAL THERAPY | Age: 43
Setting detail: THERAPIES SERIES
Discharge: HOME | End: 2023-06-28
Payer: COMMERCIAL

## 2023-06-28 PROCEDURE — 97110 THERAPEUTIC EXERCISES: CPT

## 2023-06-28 PROCEDURE — 97022 WHIRLPOOL THERAPY: CPT

## 2023-06-29 ENCOUNTER — APPOINTMENT (OUTPATIENT)
Dept: OCCUPATIONAL THERAPY | Age: 43
End: 2023-06-29
Payer: COMMERCIAL

## 2023-09-19 ENCOUNTER — HOSPITAL ENCOUNTER (OUTPATIENT)
Dept: OCCUPATIONAL THERAPY | Age: 43
Setting detail: THERAPIES SERIES
Discharge: HOME | End: 2023-09-19
Payer: COMMERCIAL

## 2023-09-19 PROCEDURE — 97165 OT EVAL LOW COMPLEX 30 MIN: CPT

## 2023-09-19 PROCEDURE — 97110 THERAPEUTIC EXERCISES: CPT

## 2023-09-19 PROCEDURE — 97140 MANUAL THERAPY 1/> REGIONS: CPT

## 2023-09-19 NOTE — PROGRESS NOTES
** PLEASE SIGN, DATE AND TIME CERTIFICATION BELOW AND RETURN TO [facility] OUTPATIENT REHABILITATION (FAX #: [phone]). ATTEST/CO-SIGN IF ACCESSING VIA INMaster Route. THANK YOU.**    I certify that I have examined the patient below and determined that Physical Medicine and Rehabilitation service is necessary and that I approve the established plan of care for up to 90 days or as specifically noted.   Attestation, signature or co-signature of physician indicates approval of certification requirements.    ________________________ ____________ __________  Physician Signature   Date   Time   [address] THERAPY  [x] EVALUATION  [] DAILY NOTE (LAND) [] DAILY NOTE (AQUATIC ) [] PROGRESS NOTE [] DISCHARGE NOTE    [] [address] [facility] [place]   [] [facility]    [] [address] [place] [address]   [x] [facility]    Date: 2023  Patient Name:  Ender Jerez  : 1980  MRN: [id number]  CSN: [id number]    Referring Practitioner Carl Hernandez MD   Diagnosis Osteoarthritis of first carpometacarpal joint, unspecified [M18.9]    Treatment Diagnosis R hand pain and weakness s/p R Th CMC arthroplasty, CTR and cubital tunnel release in April    Date of Evaluation 23      Functional Outcome Measure Used UEFI   Functional Outcome Score 54/80 (23)       Insurance: Primary: Payor: Nayeli Goyal /  /  / ,   Secondary:    Authorization Information: PRE CERTIFICATION REQUIRED: NO  INSURANCE THERAPY BENEFIT:  27 VISITS ALLOWED AT A SOFT MAX FOR PT ONLY, NONE USED   AQUATIC THERAPY COVERED: YES  MODALITIES COVERED:  YES   Visit # 1, 1/10 for progress note   Visits Allowed: , soft max   Recertification Date: 2023   Physician Follow-Up: 2023   Physician Orders: Rocky Rico   Pertinent History: Pt is 44 y/o F known to this clinic who earlier this year underwent therapy to B hand s/p Thumb CMC arthroplasties, CTRs,

## 2023-09-27 ENCOUNTER — HOSPITAL ENCOUNTER (OUTPATIENT)
Dept: OCCUPATIONAL THERAPY | Age: 43
Setting detail: THERAPIES SERIES
Discharge: HOME | End: 2023-09-27
Payer: COMMERCIAL

## 2023-09-27 PROCEDURE — 97140 MANUAL THERAPY 1/> REGIONS: CPT

## 2023-09-27 PROCEDURE — 97110 THERAPEUTIC EXERCISES: CPT

## 2023-09-27 NOTE — PROGRESS NOTES
other medical complications  []  Other:     Assessment: Pt to first session post evaluation. Trial of paraffin with stretching, STM/IASTM, isometrics, and taping for pain. Pt back to clinic tomorrow, will try different taping technique if this was too restrictive or did not provide relief. Areas for Improvement: impaired activity tolerance, impaired ROM, impaired strength, and pain  Prognosis: good-fair    GOALS:  Patient Goal: \"to be able to count on her hand again\", \"to know her hand is going to be able to tolerate longer amounts of activity without increased pain or dropping things\"    Short Term Goals:  Time Frame: 4 weeks  Marlen Bob will increase radial abduction of R thumb to >42 degrees for improved positioning and ease of grasping cups and bottles in her hand with good thumb stability. Marlen Bob will increase strength of R hand to >55 lbs for increased ease of opening jars. Marlen Bob will demonstrate compliance and independence with HEP for increased ease of IADL completion. Long Term Goals:  Time Frame: 8 weeks  *  Marlen Bob will improve UEFS to at least 59  *  Marlen Bob will report pain <2/10 with activity in R hand for increased ability to return to completion of work tasks/side jobs    Patient Education:   [x]  HEP/Education Completed: Plan of Care, Goals, HEP, self STM/pressure to [facility]st [facility] for HEP:   Tip pinch isometric to tolerance with opposite hand assisting with blocking collapse of MP joint, IF abduction for 1st dorsal interossei AROM  []  No new Education completed  [x]  Reviewed Prior HEP- care of kinesiotape      [x]  Patient verbalized and/or demonstrated understanding of education provided. []  Patient unable to verbalize and/or demonstrate understanding of education provided. Will continue education.   [x]  Barriers to learning: none    PLAN:  Treatment Recommendations: Strengthening, Range of Motion, Neuromuscular Re-education, Manual Therapy - Soft Tissue Mobilization, Manual

## 2023-09-28 ENCOUNTER — HOSPITAL ENCOUNTER (OUTPATIENT)
Dept: OCCUPATIONAL THERAPY | Age: 43
Setting detail: THERAPIES SERIES
Discharge: HOME | End: 2023-09-28
Payer: COMMERCIAL

## 2023-09-28 PROCEDURE — 97530 THERAPEUTIC ACTIVITIES: CPT

## 2023-09-28 PROCEDURE — 97140 MANUAL THERAPY 1/> REGIONS: CPT

## 2023-09-28 PROCEDURE — 97110 THERAPEUTIC EXERCISES: CPT

## 2023-09-28 NOTE — PROGRESS NOTES
[address] [facility] THERAPY  [] EVALUATION  [x] DAILY NOTE (LAND) [] DAILY NOTE (AQUATIC ) [] PROGRESS NOTE [] DISCHARGE NOTE    [] [address] [place]   [] [facility]    [] [address] [facility] [employer]   [x] [facility]    Date: 2023  Patient Name:  Berta Leung  : 1980  MRN: [id number]  CSN: [id number]    Referring Practitioner Zenia Barthel., MD   Diagnosis Osteoarthritis of first carpometacarpal joint, unspecified [M18.9]    Treatment Diagnosis R hand pain and weakness s/p R Th CMC arthroplasty, CTR and cubital tunnel release in April    Date of Evaluation 23      Functional Outcome Measure Used UEFI   Functional Outcome Score 54/80 (23)       Insurance: Primary: Payor: Naz Narvaez /  /  / ,   Secondary:    Authorization Information: PRE CERTIFICATION REQUIRED: NO  INSURANCE THERAPY BENEFIT:  27 VISITS ALLOWED AT A SOFT MAX FOR PT ONLY, NONE USED   AQUATIC THERAPY COVERED: YES  MODALITIES COVERED:  YES   Visit # 3, 3/10 for progress note   Visits Allowed: 30, soft max   Recertification Date: 2023   Physician Follow-Up: 2023   Physician Orders: Radu Kovacs and tx   Pertinent History: Pt is 42 y/o F known to this clinic who earlier this year underwent therapy to B hand s/p Thumb CMC arthroplasties, CTRs, and cubital tunnel releases. Pt discontinued therapy in  due to personal reasons and returns to therapy today with orders from her physician for eval and treat due to continued pain and limited use of her R hand. Pt reports she was let go at her job due to having her hand surgeries and being off work for and extended period of time and has not found alternate work at this point. Pt states she applied for disability which got denied. Pt states she feels she has more pain and stiffness than she should 5 months post op. R hand sx was 23.   Pt reports she is considering picking up side

## 2023-10-03 ENCOUNTER — HOSPITAL ENCOUNTER (OUTPATIENT)
Dept: OCCUPATIONAL THERAPY | Age: 43
Setting detail: THERAPIES SERIES
Discharge: HOME | End: 2023-10-03
Payer: COMMERCIAL

## 2023-10-03 PROCEDURE — 97140 MANUAL THERAPY 1/> REGIONS: CPT

## 2023-10-03 PROCEDURE — 97110 THERAPEUTIC EXERCISES: CPT

## 2023-10-03 NOTE — PROGRESS NOTES
[address] [facility] THERAPY  [] EVALUATION  [x] DAILY NOTE (LAND) [] DAILY NOTE (AQUATIC ) [] PROGRESS NOTE [] DISCHARGE NOTE    [] [address] [place]   [] [facility]    [] [address] [facility] [employer]   [x] lEa Kebede    Date: 10/3/2023  Patient Name:  Tobin Song  : 1980  MRN: [id number]  CSN: [id number]    Referring Practitioner Katharine Mascorro MD   Diagnosis Osteoarthritis of first carpometacarpal joint, unspecified [M18.9]    Treatment Diagnosis R hand pain and weakness s/p R Th CMC arthroplasty, CTR and cubital tunnel release in April    Date of Evaluation 23      Functional Outcome Measure Used UEFI   Functional Outcome Score 54/80 (23)       Insurance: Primary: Payor: Reggie Patel /  /  / ,   Secondary:    Authorization Information: PRE CERTIFICATION REQUIRED: NO  INSURANCE THERAPY BENEFIT:  27 VISITS ALLOWED AT A SOFT MAX FOR PT ONLY, NONE USED   AQUATIC THERAPY COVERED: YES  MODALITIES COVERED:  YES   Visit # 4, 4/10 for progress note   Visits Allowed: 30, soft max   Recertification Date: 2023   Physician Follow-Up: 2023   Physician Orders: Birgit Delgado and tx   Pertinent History: Pt is 44 y/o F known to this clinic who earlier this year underwent therapy to B hand s/p Thumb CMC arthroplasties, CTRs, and cubital tunnel releases. Pt discontinued therapy in  due to personal reasons and returns to therapy today with orders from her physician for eval and treat due to continued pain and limited use of her R hand. Pt reports she was let go at her job due to having her hand surgeries and being off work for and extended period of time and has not found alternate work at this point. Pt states she applied for disability which got denied. Pt states she feels she has more pain and stiffness than she should 5 months post op. R hand sx was 23.   Pt reports she is considering picking up side

## 2023-10-06 ENCOUNTER — HOSPITAL ENCOUNTER (OUTPATIENT)
Dept: OCCUPATIONAL THERAPY | Age: 43
Setting detail: THERAPIES SERIES
Discharge: HOME | End: 2023-10-06
Payer: COMMERCIAL

## 2023-10-06 PROCEDURE — 97110 THERAPEUTIC EXERCISES: CPT

## 2023-10-06 PROCEDURE — 97140 MANUAL THERAPY 1/> REGIONS: CPT

## 2023-10-06 NOTE — PROGRESS NOTES
[address] [facility] THERAPY  [] EVALUATION  [x] DAILY NOTE (LAND) [] DAILY NOTE (AQUATIC ) [] PROGRESS NOTE [] DISCHARGE NOTE    [] [address] - [place]   [] [address] [facility]    [] [facility]   [x] Ela Kebede    Date: 10/6/2023  Patient Name:  Tobin Song  : 1980  MRN: [id number]  CSN: [id number]    Referring Practitioner Katharine Mascorro MD   Diagnosis Osteoarthritis of first carpometacarpal joint, unspecified [M18.9]    Treatment Diagnosis R hand pain and weakness s/p R Th CMC arthroplasty, CTR and cubital tunnel release in April    Date of Evaluation 23      Functional Outcome Measure Used UEFI   Functional Outcome Score 54/80 (23)       Insurance: Primary: Payor: Reggie Patel /  /  / ,   Secondary:    Authorization Information: PRE CERTIFICATION REQUIRED: NO  INSURANCE THERAPY BENEFIT:  27 VISITS ALLOWED AT A SOFT MAX FOR PT ONLY, NONE USED   AQUATIC THERAPY COVERED: YES  MODALITIES COVERED:  YES   Visit # 5, 5/10 for progress note   Visits Allowed: 30, soft max   Recertification Date: 2023   Physician Follow-Up: 2023   Physician Orders: Birgit Delgado and tx   Pertinent History: Pt is 44 y/o F known to this clinic who earlier this year underwent therapy to B hand s/p Thumb CMC arthroplasties, CTRs, and cubital tunnel releases. Pt discontinued therapy in  due to personal reasons and returns to therapy today with orders from her physician for eval and treat due to continued pain and limited use of her R hand. Pt reports she was let go at her job due to having her hand surgeries and being off work for and extended period of time and has not found alternate work at this point. Pt states she applied for disability which got denied. Pt states she feels she has more pain and stiffness than she should 5 months post op. R hand sx was 23.   Pt reports she is considering picking up side

## 2023-10-10 ENCOUNTER — HOSPITAL ENCOUNTER (OUTPATIENT)
Dept: OCCUPATIONAL THERAPY | Age: 43
Setting detail: THERAPIES SERIES
Discharge: HOME | End: 2023-10-10
Payer: COMMERCIAL

## 2023-10-10 PROCEDURE — 97110 THERAPEUTIC EXERCISES: CPT

## 2023-10-10 PROCEDURE — 97140 MANUAL THERAPY 1/> REGIONS: CPT

## 2023-10-10 NOTE — PROGRESS NOTES
[address] [facility] THERAPY  [] EVALUATION  [x] DAILY NOTE (LAND) [] DAILY NOTE (AQUATIC ) [] PROGRESS NOTE [] DISCHARGE NOTE    [] [address] - [place]   [] [address] [facility]    [] [address] [facility]   [x] Brooklynn Oh    Date: 10/10/2023  Patient Name:  Robina Wood  : 1980  MRN: [id number]  CSN: [id number]    Referring Practitioner Estephanie Corona MD   Diagnosis Osteoarthritis of first carpometacarpal joint, unspecified [M18.9]    Treatment Diagnosis R hand pain and weakness s/p R Th CMC arthroplasty, CTR and cubital tunnel release in April    Date of Evaluation 23      Functional Outcome Measure Used UEFI   Functional Outcome Score 54/80 (23)       Insurance: Primary: Payor: Roberta Sheth /  /  / ,   Secondary:    Authorization Information: PRE CERTIFICATION REQUIRED: NO  INSURANCE THERAPY BENEFIT:  27 VISITS ALLOWED AT A SOFT MAX FOR PT ONLY, NONE USED   AQUATIC THERAPY COVERED: YES  MODALITIES COVERED:  YES   Visit # 6, 610 for progress note   Visits Allowed: 30, soft max   Recertification Date: 2023   Physician Follow-Up: 2023   Physician Orders: Kayleen Tompkins and tx   Pertinent History: Pt is 42 y/o F known to this clinic who earlier this year underwent therapy to B hand s/p Thumb CMC arthroplasties, CTRs, and cubital tunnel releases. Pt discontinued therapy in  due to personal reasons and returns to therapy today with orders from her physician for eval and treat due to continued pain and limited use of her R hand. Pt reports she was let go at her job due to having her hand surgeries and being off work for and extended period of time and has not found alternate work at this point. Pt states she applied for disability which got denied. Pt states she feels she has more pain and stiffness than she should 5 months post op. R hand sx was 23.   Pt reports she is considering picking up

## 2023-10-13 ENCOUNTER — HOSPITAL ENCOUNTER (OUTPATIENT)
Dept: OCCUPATIONAL THERAPY | Age: 43
Setting detail: THERAPIES SERIES
Discharge: HOME | End: 2023-10-13
Payer: COMMERCIAL

## 2023-10-13 PROCEDURE — 97110 THERAPEUTIC EXERCISES: CPT

## 2023-10-13 PROCEDURE — 97530 THERAPEUTIC ACTIVITIES: CPT

## 2023-10-13 PROCEDURE — 97140 MANUAL THERAPY 1/> REGIONS: CPT

## 2023-10-13 NOTE — PROGRESS NOTES
[address] [facility] THERAPY  [] EVALUATION  [x] DAILY NOTE (LAND) [] DAILY NOTE (AQUATIC ) [] PROGRESS NOTE [] DISCHARGE NOTE    [] [address] - [place]   [] [address] [facility]    [] [address] [facility]   [x] Dina Smith    Date: 10/13/2023  Patient Name:  Jey Downing  : 1980  MRN: [id number]  CSN: [id number]    Referring Practitioner Hayden Crigler., MD   Diagnosis Osteoarthritis of first carpometacarpal joint, unspecified [M18.9]    Treatment Diagnosis R hand pain and weakness s/p R Th CMC arthroplasty, CTR and cubital tunnel release in April    Date of Evaluation 23      Functional Outcome Measure Used UEFI   Functional Outcome Score 54/80 (23)       Insurance: Primary: Payor: Danny Moreira /  /  / ,   Secondary:    Authorization Information: PRE CERTIFICATION REQUIRED: NO  INSURANCE THERAPY BENEFIT:  27 VISITS ALLOWED AT A SOFT MAX FOR PT ONLY, NONE USED   AQUATIC THERAPY COVERED: YES  MODALITIES COVERED:  YES   Visit # 7, 710 for progress note   Visits Allowed: 30, soft max   Recertification Date: 2023   Physician Follow-Up: 2023   Physician Orders: Nila Quarles and tx   Pertinent History: Pt is 42 y/o F known to this clinic who earlier this year underwent therapy to B hand s/p Thumb CMC arthroplasties, CTRs, and cubital tunnel releases. Pt discontinued therapy in  due to personal reasons and returns to therapy today with orders from her physician for eval and treat due to continued pain and limited use of her R hand. Pt reports she was let go at her job due to having her hand surgeries and being off work for and extended period of time and has not found alternate work at this point. Pt states she applied for disability which got denied. Pt states she feels she has more pain and stiffness than she should 5 months post op. R hand sx was 23.   Pt reports she is considering picking up

## 2023-10-17 ENCOUNTER — APPOINTMENT (OUTPATIENT)
Dept: OCCUPATIONAL THERAPY | Age: 43
End: 2023-10-17
Payer: COMMERCIAL

## 2023-10-18 ENCOUNTER — HOSPITAL ENCOUNTER (OUTPATIENT)
Dept: OCCUPATIONAL THERAPY | Age: 43
Setting detail: THERAPIES SERIES
Discharge: HOME | End: 2023-10-18
Payer: COMMERCIAL

## 2023-10-18 PROCEDURE — 97110 THERAPEUTIC EXERCISES: CPT

## 2023-10-18 PROCEDURE — 97140 MANUAL THERAPY 1/> REGIONS: CPT

## 2023-10-18 PROCEDURE — 97530 THERAPEUTIC ACTIVITIES: CPT

## 2023-10-18 NOTE — PROGRESS NOTES
[address] [facility] THERAPY  [] EVALUATION  [x] DAILY NOTE (LAND) [] DAILY NOTE (AQUATIC ) [] PROGRESS NOTE [] DISCHARGE NOTE    [] [address] - [place]   [] [address] [facility]    [] [address] [facility]   [x] Emerita Ruano    Date: 10/18/2023  Patient Name:  Marcheta Leventhal  : 1980  MRN: [id number]  CSN: [id number]    Referring Practitioner Nani Herring MD   Diagnosis Osteoarthritis of first carpometacarpal joint, unspecified [M18.9]    Treatment Diagnosis R hand pain and weakness s/p R Th CMC arthroplasty, CTR and cubital tunnel release in April    Date of Evaluation 23      Functional Outcome Measure Used UEFI   Functional Outcome Score 54/80 (23)       Insurance: Primary: Payor: Agnes Grimes /  /  / ,   Secondary:    Authorization Information: PRE CERTIFICATION REQUIRED: NO  INSURANCE THERAPY BENEFIT:  27 VISITS ALLOWED AT A SOFT MAX FOR PT ONLY, NONE USED   AQUATIC THERAPY COVERED: YES  MODALITIES COVERED:  YES   Visit # 7, 710 for progress note   Visits Allowed: 30, soft max   Recertification Date: 2023   Physician Follow-Up: 2023   Physician Orders: Ramírez Garcia and tx   Pertinent History: Pt is 44 y/o F known to this clinic who earlier this year underwent therapy to B hand s/p Thumb CMC arthroplasties, CTRs, and cubital tunnel releases. Pt discontinued therapy in  due to personal reasons and returns to therapy today with orders from her physician for eval and treat due to continued pain and limited use of her R hand. Pt reports she was let go at her job due to having her hand surgeries and being off work for and extended period of time and has not found alternate work at this point. Pt states she applied for disability which got denied. Pt states she feels she has more pain and stiffness than she should 5 months post op. R hand sx was 23.   Pt reports she is considering picking up

## 2023-10-20 ENCOUNTER — APPOINTMENT (OUTPATIENT)
Dept: OCCUPATIONAL THERAPY | Age: 43
End: 2023-10-20
Payer: COMMERCIAL

## 2023-10-25 ENCOUNTER — HOSPITAL ENCOUNTER (OUTPATIENT)
Dept: OCCUPATIONAL THERAPY | Age: 43
Setting detail: THERAPIES SERIES
Discharge: HOME | End: 2023-10-25
Payer: COMMERCIAL

## 2023-10-25 PROCEDURE — 97530 THERAPEUTIC ACTIVITIES: CPT

## 2023-10-25 PROCEDURE — 97140 MANUAL THERAPY 1/> REGIONS: CPT

## 2023-10-25 PROCEDURE — 97110 THERAPEUTIC EXERCISES: CPT

## 2023-10-25 NOTE — PROGRESS NOTES
[address] [facility] THERAPY  [] EVALUATION  [] DAILY NOTE (LAND) [] DAILY NOTE (AQUATIC ) [x] PROGRESS NOTE [] DISCHARGE NOTE    [] [facility] [place]   [] [address] [place] [address]    [] [facility]   [x] Santana May    Date: 10/25/2023  Patient Name:  Steffany Bonner  : 1980  MRN: [id number]  CSN: [id number]    Referring Practitioner Rainer Mcneill MD   Diagnosis Osteoarthritis of first carpometacarpal joint, unspecified [M18.9]    Treatment Diagnosis R hand pain and weakness s/p R Th CMC arthroplasty, CTR and cubital tunnel release in April    Date of Evaluation 23      Functional Outcome Measure Used UEFI   Functional Outcome Score 54/80 (23) 76/80 (10/25/23)      Insurance: Primary: Payor: Bishop Jaeger /  /  / ,   Secondary:    Authorization Information: PRE CERTIFICATION REQUIRED: NO  INSURANCE THERAPY BENEFIT:  27 VISITS ALLOWED AT A SOFT MAX FOR PT ONLY, NONE USED   AQUATIC THERAPY COVERED: YES  MODALITIES COVERED:  YES   Visit # 8, 8/10 for progress note   Visits Allowed: 30, soft max   Recertification Date: 2023   Physician Follow-Up: 2023   Physician Orders: Eulogio Pringle and tx   Pertinent History: Pt is 42 y/o F known to this clinic who earlier this year underwent therapy to B hand s/p Thumb CMC arthroplasties, CTRs, and cubital tunnel releases. Pt discontinued therapy in  due to personal reasons and returns to therapy today with orders from her physician for eval and treat due to continued pain and limited use of her R hand. Pt reports she was let go at her job due to having her hand surgeries and being off work for and extended period of time and has not found alternate work at this point. Pt states she applied for disability which got denied. Pt states she feels she has more pain and stiffness than she should 5 months post op. R hand sx was 23.   Pt reports she is

## 2025-01-10 NOTE — ED NOTES
Pt ambulated to restroom with no difficulties.       Nolan Mcdaniel RN  09/17/22 [id number] continue to monitor  - PT encourage movement